# Patient Record
Sex: MALE | Race: BLACK OR AFRICAN AMERICAN | NOT HISPANIC OR LATINO | Employment: FULL TIME | URBAN - METROPOLITAN AREA
[De-identification: names, ages, dates, MRNs, and addresses within clinical notes are randomized per-mention and may not be internally consistent; named-entity substitution may affect disease eponyms.]

---

## 2023-05-02 ENCOUNTER — PATIENT OUTREACH (OUTPATIENT)
Dept: CARE COORDINATION | Facility: CLINIC | Age: 53
End: 2023-05-02
Payer: COMMERCIAL

## 2023-08-10 LAB
ALANINE AMINOTRANSFERASE (SGPT) (U/L) IN SER/PLAS: 13 U/L (ref 10–52)
ALBUMIN (G/DL) IN SER/PLAS: 4.3 G/DL (ref 3.4–5)
ALKALINE PHOSPHATASE (U/L) IN SER/PLAS: 51 U/L (ref 33–120)
ANION GAP IN SER/PLAS: 11 MMOL/L (ref 10–20)
ASPARTATE AMINOTRANSFERASE (SGOT) (U/L) IN SER/PLAS: 15 U/L (ref 9–39)
BILIRUBIN TOTAL (MG/DL) IN SER/PLAS: 0.3 MG/DL (ref 0–1.2)
CALCIUM (MG/DL) IN SER/PLAS: 9 MG/DL (ref 8.6–10.3)
CARBON DIOXIDE, TOTAL (MMOL/L) IN SER/PLAS: 27 MMOL/L (ref 21–32)
CHLORIDE (MMOL/L) IN SER/PLAS: 106 MMOL/L (ref 98–107)
CHOLESTEROL (MG/DL) IN SER/PLAS: 129 MG/DL (ref 0–199)
CHOLESTEROL IN HDL (MG/DL) IN SER/PLAS: 50 MG/DL
CHOLESTEROL/HDL RATIO: 2.6
CREATININE (MG/DL) IN SER/PLAS: 1.19 MG/DL (ref 0.5–1.3)
CREATININE (MG/DL) IN URINE: 144 MG/DL (ref 20–370)
ERYTHROCYTE DISTRIBUTION WIDTH (RATIO) BY AUTOMATED COUNT: 14.2 % (ref 11.5–14.5)
ERYTHROCYTE MEAN CORPUSCULAR HEMOGLOBIN CONCENTRATION (G/DL) BY AUTOMATED: 31.4 G/DL (ref 32–36)
ERYTHROCYTE MEAN CORPUSCULAR VOLUME (FL) BY AUTOMATED COUNT: 88 FL (ref 80–100)
ERYTHROCYTES (10*6/UL) IN BLOOD BY AUTOMATED COUNT: 5.02 X10E12/L (ref 4.5–5.9)
GFR MALE: 74 ML/MIN/1.73M2
GLUCOSE (MG/DL) IN SER/PLAS: 114 MG/DL (ref 74–99)
HEMATOCRIT (%) IN BLOOD BY AUTOMATED COUNT: 44.2 % (ref 41–52)
HEMOGLOBIN (G/DL) IN BLOOD: 13.9 G/DL (ref 13.5–17.5)
LDL: 66 MG/DL (ref 0–99)
LEUKOCYTES (10*3/UL) IN BLOOD BY AUTOMATED COUNT: 7.9 X10E9/L (ref 4.4–11.3)
NRBC (PER 100 WBCS) BY AUTOMATED COUNT: 0 /100 WBC (ref 0–0)
PLATELETS (10*3/UL) IN BLOOD AUTOMATED COUNT: 309 X10E9/L (ref 150–450)
POTASSIUM (MMOL/L) IN SER/PLAS: 3.6 MMOL/L (ref 3.5–5.3)
PROTEIN (MG/DL) IN URINE: 12 MG/DL (ref 5–25)
PROTEIN TOTAL: 6.8 G/DL (ref 6.4–8.2)
PROTEIN/CREATININE (MG/MG) IN URINE: 0.08 MG/MG CREAT (ref 0–0.17)
SODIUM (MMOL/L) IN SER/PLAS: 140 MMOL/L (ref 136–145)
THYROTROPIN (MIU/L) IN SER/PLAS BY DETECTION LIMIT <= 0.05 MIU/L: 0.28 MIU/L (ref 0.44–3.98)
THYROXINE (T4) FREE (NG/DL) IN SER/PLAS: 1.06 NG/DL (ref 0.61–1.12)
TRIGLYCERIDE (MG/DL) IN SER/PLAS: 66 MG/DL (ref 0–149)
UREA NITROGEN (MG/DL) IN SER/PLAS: 17 MG/DL (ref 6–23)
VLDL: 13 MG/DL (ref 0–40)

## 2023-11-03 PROBLEM — M54.16 LUMBAR RADICULOPATHY: Status: ACTIVE | Noted: 2023-11-03

## 2023-11-03 PROBLEM — I10 HYPERTENSION: Status: ACTIVE | Noted: 2023-11-03

## 2023-11-03 PROBLEM — R53.83 FATIGUE: Status: ACTIVE | Noted: 2023-11-03

## 2023-11-03 PROBLEM — R79.89 LOW TSH LEVEL: Status: ACTIVE | Noted: 2023-11-03

## 2023-11-03 PROBLEM — N45.1 EPIDIDYMITIS, RIGHT: Status: ACTIVE | Noted: 2023-11-03

## 2023-11-03 PROBLEM — R60.0 BILATERAL EDEMA OF LOWER EXTREMITY: Status: ACTIVE | Noted: 2023-11-03

## 2023-11-03 PROBLEM — G89.29 CHRONIC LOW BACK PAIN: Status: ACTIVE | Noted: 2023-11-03

## 2023-11-03 PROBLEM — N50.819 PAIN IN TESTICLE DUE TO TRAUMA: Status: ACTIVE | Noted: 2023-11-03

## 2023-11-03 PROBLEM — N39.3 URINARY STRESS INCONTINENCE, MALE: Status: ACTIVE | Noted: 2023-11-03

## 2023-11-03 PROBLEM — M67.40 GANGLION CYST: Status: ACTIVE | Noted: 2023-11-03

## 2023-11-03 PROBLEM — N52.31 ERECTILE DYSFUNCTION FOLLOWING RADICAL PROSTATECTOMY: Status: ACTIVE | Noted: 2023-11-03

## 2023-11-03 PROBLEM — J45.909 ASTHMA (HHS-HCC): Status: ACTIVE | Noted: 2023-11-03

## 2023-11-03 PROBLEM — E87.0 HYPERNATREMIA: Status: ACTIVE | Noted: 2023-11-03

## 2023-11-03 PROBLEM — M54.50 CHRONIC LOW BACK PAIN: Status: ACTIVE | Noted: 2023-11-03

## 2023-11-03 PROBLEM — I48.91 AFIB (MULTI): Status: ACTIVE | Noted: 2023-11-03

## 2023-11-03 PROBLEM — C61 PROSTATE CANCER (MULTI): Status: ACTIVE | Noted: 2023-11-03

## 2023-11-03 PROBLEM — Z86.59 HISTORY OF CLAUSTROPHOBIA: Status: ACTIVE | Noted: 2023-11-03

## 2023-11-03 PROBLEM — I48.0 PAROXYSMAL ATRIAL FIBRILLATION WITH RAPID VENTRICULAR RESPONSE (MULTI): Status: ACTIVE | Noted: 2023-11-03

## 2023-11-03 PROBLEM — J30.2 SEASONAL ALLERGIES: Status: ACTIVE | Noted: 2023-11-03

## 2023-11-03 PROBLEM — N32.0 BLADDER NECK CONTRACTURE: Status: ACTIVE | Noted: 2023-11-03

## 2023-11-03 RX ORDER — AMLODIPINE BESYLATE 10 MG/1
10 TABLET ORAL DAILY
COMMUNITY

## 2023-11-03 RX ORDER — FLUTICASONE FUROATE, UMECLIDINIUM BROMIDE AND VILANTEROL TRIFENATATE 200; 62.5; 25 UG/1; UG/1; UG/1
POWDER RESPIRATORY (INHALATION)
COMMUNITY

## 2023-11-03 RX ORDER — DOXAZOSIN 1 MG/1
1 TABLET ORAL DAILY
COMMUNITY

## 2023-11-03 RX ORDER — HYDRALAZINE HYDROCHLORIDE 50 MG/1
50 TABLET, FILM COATED ORAL 3 TIMES DAILY
COMMUNITY

## 2023-11-03 RX ORDER — ALBUTEROL SULFATE 0.83 MG/ML
SOLUTION RESPIRATORY (INHALATION)
COMMUNITY
Start: 2023-04-01

## 2023-11-03 RX ORDER — METOPROLOL TARTRATE 25 MG/1
25 TABLET, FILM COATED ORAL 2 TIMES DAILY
COMMUNITY
Start: 2023-05-01 | End: 2024-01-29 | Stop reason: SDUPTHER

## 2023-11-03 RX ORDER — OLMESARTAN MEDOXOMIL 40 MG/1
40 TABLET ORAL DAILY
COMMUNITY

## 2023-11-03 RX ORDER — METOPROLOL TARTRATE 50 MG/1
50 TABLET ORAL 2 TIMES DAILY
COMMUNITY
End: 2024-03-27 | Stop reason: SDUPTHER

## 2023-11-03 RX ORDER — FLECAINIDE ACETATE 100 MG/1
100 TABLET ORAL 2 TIMES DAILY
COMMUNITY
End: 2024-01-12 | Stop reason: SDUPTHER

## 2023-11-03 RX ORDER — FUROSEMIDE 20 MG/1
20 TABLET ORAL DAILY
COMMUNITY
End: 2024-02-01 | Stop reason: SDUPTHER

## 2023-11-06 ENCOUNTER — OFFICE VISIT (OUTPATIENT)
Dept: CARDIOLOGY | Facility: CLINIC | Age: 53
End: 2023-11-06
Payer: COMMERCIAL

## 2023-11-06 VITALS
WEIGHT: 315 LBS | HEART RATE: 68 BPM | BODY MASS INDEX: 40.43 KG/M2 | HEIGHT: 74 IN | OXYGEN SATURATION: 94 % | SYSTOLIC BLOOD PRESSURE: 144 MMHG | DIASTOLIC BLOOD PRESSURE: 90 MMHG

## 2023-11-06 DIAGNOSIS — I48.0 PAROXYSMAL ATRIAL FIBRILLATION WITH RAPID VENTRICULAR RESPONSE (MULTI): Primary | ICD-10-CM

## 2023-11-06 PROCEDURE — 3080F DIAST BP >= 90 MM HG: CPT | Performed by: INTERNAL MEDICINE

## 2023-11-06 PROCEDURE — 3077F SYST BP >= 140 MM HG: CPT | Performed by: INTERNAL MEDICINE

## 2023-11-06 PROCEDURE — 99214 OFFICE O/P EST MOD 30 MIN: CPT | Performed by: INTERNAL MEDICINE

## 2023-11-06 NOTE — ASSESSMENT & PLAN NOTE
1.  Paroxysmal atrial fibrillation: Lennox is in sinus rhythm today and doing well on flecainide.  He has a remote history of pulmonary vein isolation and has had some symptomatic recurrences but overall is maintaining sinus rhythm.  We can consider a repeat PVI procedure in the future if necessary.  For now the AF burden is low and we will continue with pharmacologic management.    2.  Hypertension: Continue same medication regimen.

## 2023-11-06 NOTE — PROGRESS NOTES
"History Of Present Illness:      This is a 52-year-old male with hypertension and paroxysmal atrial fibrillation.  He was hospitalized at [facility] earlier this year because of pneumonia.  Since then he has been feeling well with no complaints of palpitations, chest pain, or shortness of breath.  He has had no side effects on flecainide and has not had any symptomatic recurrences of atrial fibrillation for the past several months.    Review of Systems  Other review of systems negative     Last Recorded Vitals:      12/7/2022     9:19 AM 12/9/2022    10:33 AM 1/25/2023     1:58 PM 5/22/2023     2:49 PM 5/22/2023     3:06 PM 6/9/2023     9:42 AM 11/6/2023     9:28 AM   Vitals   Systolic 142 132 140 150 138 144 144   Diastolic 89 88 88 90 80 78 90   Heart Rate 86 77 71 72  69 68   Temp 36.4 °C (97.6 °F) 36.7 °C (98.1 °F)    36.6 °C (97.8 °F)    Height (in) 1.854 m (6' 1\") 1.854 m (6' 1\") 1.854 m (6' 1\") 1.854 m (6' 1\")  1.854 m (6' 1\") 1.88 m (6' 2\")   Weight (lb) 320 317.31 326 327  327.44 330   BMI 42.22 kg/m2 41.86 kg/m2 43.01 kg/m2 43.14 kg/m2  43.2 kg/m2 42.37 kg/m2   BSA (m2) 2.73 m2 2.72 m2 2.76 m2 2.76 m2  2.77 m2 2.8 m2   Visit Report       Report          Allergies:  Iodine and Shellfish derived    Outpatient Medications:  Current Outpatient Medications   Medication Instructions    albuterol 2.5 mg /3 mL (0.083 %) nebulizer solution inhale contents of 1 vial ( 3 milliliters ) in nebulizer by mouth...  (REFER TO PRESCRIPTION NOTES).    amLODIPine (NORVASC) 10 mg, oral, Daily    doxazosin (CARDURA) 1 mg, oral, Daily    flecainide (TAMBOCOR) 100 mg, oral, 2 times daily    furosemide (LASIX) 20 mg, oral, Daily    hydrALAZINE (APRESOLINE) 50 mg, oral, 3 times daily    metoprolol tartrate (LOPRESSOR) 25 mg, oral, 2 times daily    metoprolol tartrate (LOPRESSOR) 50 mg, oral, 2 times daily    olmesartan (BENICAR) 40 mg, oral, Daily    Trelegy Ellipta 200-62.5-25 mcg blister with device INHALE " ONE INHALATION BY MOUTH ONCE DAILY       Physical Exam:    General Appearance:  Alert, oriented, no distress  Skin:  Warm and dry  Head and Neck:  No elevation of JVP, no carotid bruits  Cardiac Exam:  Rhythm is regular, S1 and S2 are normal, no murmur S3 or S4  Lungs:  Clear to auscultation  Extremities:  no edema  Neurologic:  No focal deficits  Psychiatric:  Appropriate mood and behavior         Cardiology Tests:  I have personally review the diagnostic cardiac testing and my interpretation is as follows:    Echocardiogram May 2023: Normal left ventricular function      Assessment/Plan   Problem List Items Addressed This Visit             ICD-10-CM    Paroxysmal atrial fibrillation with rapid ventricular response (CMS/HCC) - Primary I48.0     1.  Paroxysmal atrial fibrillation: Lennox is in sinus rhythm today and doing well on flecainide.  He has a remote history of pulmonary vein isolation and has had some symptomatic recurrences but overall is maintaining sinus rhythm.  We can consider a repeat PVI procedure in the future if necessary.  For now the AF burden is low and we will continue with pharmacologic management.    2.  Hypertension: Continue same medication regimen.            James C Ramicone, DO

## 2023-11-09 ENCOUNTER — OFFICE VISIT (OUTPATIENT)
Dept: UROLOGY | Facility: HOSPITAL | Age: 53
End: 2023-11-09
Payer: COMMERCIAL

## 2023-11-09 ENCOUNTER — PREP FOR PROCEDURE (OUTPATIENT)
Dept: UROLOGY | Facility: HOSPITAL | Age: 53
End: 2023-11-09

## 2023-11-09 VITALS — HEART RATE: 63 BPM | DIASTOLIC BLOOD PRESSURE: 79 MMHG | SYSTOLIC BLOOD PRESSURE: 134 MMHG

## 2023-11-09 DIAGNOSIS — N52.31 ERECTILE DYSFUNCTION FOLLOWING RADICAL PROSTATECTOMY: Primary | ICD-10-CM

## 2023-11-09 DIAGNOSIS — N52.31 ERECTILE DYSFUNCTION AFTER RADICAL PROSTATECTOMY: Primary | ICD-10-CM

## 2023-11-09 PROCEDURE — 99214 OFFICE O/P EST MOD 30 MIN: CPT | Performed by: UROLOGY

## 2023-11-09 PROCEDURE — 3075F SYST BP GE 130 - 139MM HG: CPT | Performed by: UROLOGY

## 2023-11-09 PROCEDURE — 3078F DIAST BP <80 MM HG: CPT | Performed by: UROLOGY

## 2023-11-09 RX ORDER — FLUCONAZOLE 2 MG/ML
200 INJECTION, SOLUTION INTRAVENOUS ONCE
Status: CANCELLED | OUTPATIENT
Start: 2023-11-09 | End: 2023-11-09

## 2023-11-09 RX ORDER — ACETAMINOPHEN 325 MG/1
650 TABLET ORAL ONCE
Status: CANCELLED | OUTPATIENT
Start: 2023-11-09 | End: 2023-11-09

## 2023-11-09 RX ORDER — GABAPENTIN 300 MG/1
600 CAPSULE ORAL ONCE
Status: CANCELLED | OUTPATIENT
Start: 2023-11-09 | End: 2023-11-09

## 2023-11-09 RX ORDER — CHLORHEXIDINE GLUCONATE 40 MG/ML
SOLUTION TOPICAL DAILY PRN
Status: CANCELLED | OUTPATIENT
Start: 2023-11-09

## 2023-11-09 RX ORDER — SODIUM CHLORIDE, SODIUM LACTATE, POTASSIUM CHLORIDE, CALCIUM CHLORIDE 600; 310; 30; 20 MG/100ML; MG/100ML; MG/100ML; MG/100ML
100 INJECTION, SOLUTION INTRAVENOUS CONTINUOUS
Status: CANCELLED | OUTPATIENT
Start: 2023-11-09

## 2023-11-09 ASSESSMENT — PAIN SCALES - GENERAL: PAINLEVEL: 0-NO PAIN

## 2023-11-09 NOTE — PROGRESS NOTES
NAME:Lennox Domingo  DATE: 11/9/2023               Subjective:   Chief complaint: ED    HPI:  52 y.o. male with h/o Pca s/p RALP with Dr. Martinez.  PSA UDT    Pt with h/o bladder neck contracture s/p BNI Feb 2022.  Had AUS in sept 2022.  Leakage improved but still has some bothersome incontinence.      Pt with severe ED. Failed PDE5i, not able to do ICI     Past Medical History:   Diagnosis Date    Achilles tendinitis, unspecified leg 01/28/2018    Tendonitis, Achilles    Nocturia 03/04/2016    Nocturia    Olecranon bursitis, left elbow 04/01/2016    Olecranon bursitis of left elbow    Other specified abnormal findings of blood chemistry 10/24/2022    Low TSH level    Other specified soft tissue disorders 06/08/2016    Swelling of left lower extremity    Other specified soft tissue disorders 06/06/2016    Calf swelling    Personal history of diseases of the skin and subcutaneous tissue 01/08/2016    History of contact dermatitis    Personal history of other diseases of the circulatory system     History of atrial fibrillation    Personal history of other diseases of the circulatory system     History of hypertension    Personal history of other diseases of the circulatory system 03/21/2018    History of atrial fibrillation    Personal history of other infectious and parasitic diseases 12/03/2020    History of viral infection    Personal history of pneumonia (recurrent)     History of pneumonia    Sprain of unspecified ligament of unspecified ankle, initial encounter 11/23/2016    Ankle sprain    Unspecified asthma with (acute) exacerbation 03/05/2018    Asthma exacerbation     Past Surgical History:   Procedure Laterality Date    ABLATION OF DYSRHYTHMIC FOCUS  01/08/2016    Catheter Ablation     Social History     Tobacco Use    Smoking status: Not on file    Smokeless tobacco: Not on file   Substance Use Topics    Alcohol use: Not on file     No family history on file.    Current Outpatient Medications on File Prior  to Visit   Medication Sig Dispense Refill    albuterol 2.5 mg /3 mL (0.083 %) nebulizer solution inhale contents of 1 vial ( 3 milliliters ) in nebulizer by mouth...  (REFER TO PRESCRIPTION NOTES).      amLODIPine (Norvasc) 10 mg tablet Take 1 tablet (10 mg) by mouth once daily.      doxazosin (Cardura) 1 mg tablet Take 1 tablet (1 mg) by mouth once daily.      flecainide (Tambocor) 100 mg tablet Take 1 tablet (100 mg) by mouth 2 times a day.      furosemide (Lasix) 20 mg tablet Take 1 tablet (20 mg) by mouth once daily.      hydrALAZINE (Apresoline) 50 mg tablet Take 1 tablet (50 mg) by mouth 3 times a day.      metoprolol tartrate (Lopressor) 25 mg tablet Take 1 tablet (25 mg) by mouth 2 times a day.      metoprolol tartrate (Lopressor) 50 mg tablet Take 1 tablet by mouth 2 times a day.      olmesartan (BENIcar) 40 mg tablet Take 1 tablet (40 mg) by mouth once daily.      Trelegy Ellipta 200-62.5-25 mcg blister with device INHALE ONE INHALATION BY MOUTH ONCE DAILY       No current facility-administered medications on file prior to visit.     Lennox is allergic to iodine and shellfish derived.       Objective:     There is no height or weight on file to calculate BMI.   There were no vitals taken for this visit.     Physical Exam   1. Constitutional: NAD, Well-developed, Well-nourished  2. Respiratory: Unlabored breathing, no audible wheezes, no use of accessory muscles   3. Cardiovascular: No JVD, extremities perfused, no edema  4. Abdomen: Soft, non-tender, non-distended, no masses or hernia.  No CVA tenderness.    5. Skin: no visible lesions, plaque, rashes, jaundice  6. Neuro: Gait normal, no focal neurologic deficit  7. Psychiatric: Mood and affect normal and appropriate, alert and oriented    Labs  Lab Results   Component Value Date    PSA <0.1 05/11/2022     Lab Results   Component Value Date    GFRMALE 74 08/10/2023     Lab Results   Component Value Date    CREATININE 1.19 08/10/2023     Lab Results    Component Value Date    CHOL 129 08/10/2023     Lab Results   Component Value Date    HDL 50.0 08/10/2023     Lab Results   Component Value Date    CHHDL 2.6 08/10/2023     Lab Results   Component Value Date    LDLF 66 08/10/2023     Lab Results   Component Value Date    VLDL 13 08/10/2023     Lab Results   Component Value Date    TRIG 66 08/10/2023     Lab Results   Component Value Date    HGBA1C 5.4 10/21/2022     Lab Results   Component Value Date    HCT 44.2 08/10/2023       Imaging    Procedures:     PVR:    Assessment/Plan:   Lennox Domingo presents with       1. Erectile dysfunction following radical prostatectomy      Plan for AMS Cx IPP after new year    Penile Prosthesis  The patient has erectile dysfunction refractory to conservative management. Etiology of his erectile dysfunction is post prostatectomy.     We discussed that further treatment would require penile prosthesis surgery.  We discussed that this artificial device would be placed into the penis and disrupt the tissue that creates natural erections so he can never go back to another kind of treatment for erections.  His penis may look and feel different, even with a completely deflated prosthesis.  We discussed that this procedure will not make his penis longer, and in fact he may look smaller then his previous natural erections.  We may have the patient use the vacuum erectile device to increase blood flow to his penis, if he is amenable.      We discussed the differences between the malleable (semirigid) and inflatable (hydraulic) types of penile prosthesis.  We discussed that the inflatable prosthesis would also include a pump in the scrotum and a reservoir that would be placed in the pelvis or abdominal wall.  We discussed that sometimes placement of the reservoir requires a second incision.  Models were used to show the prosthesis and patient was able to work with them.    Risks of the surgery were discussed, which include but are not  limited to pain, bleeding, erosion of the prosthesis, mechanical failure requiring surgical replacement, urinary retention, infection of the prosthesis requiring surgical removal and/or replacement, and damage to surrounding structures including the penis, urethra, bladder, and pelvic structures.      After all of the patient's questions were satisfactorily answered, he expressed understanding of the risks of surgery and wishes to proceed.  No barriers to learning were identified.    Patient ill be scheduled in a timely fashion.  He will see pre-admission testing and obtain any clearance if necessary.

## 2023-11-17 ENCOUNTER — PROCEDURE VISIT (OUTPATIENT)
Dept: CARDIOLOGY | Facility: CLINIC | Age: 53
End: 2023-11-17
Payer: COMMERCIAL

## 2023-11-17 DIAGNOSIS — N52.31 ERECTILE DYSFUNCTION AFTER RADICAL PROSTATECTOMY: ICD-10-CM

## 2023-11-17 PROCEDURE — 93005 ELECTROCARDIOGRAM TRACING: CPT

## 2023-11-17 PROCEDURE — 93010 ELECTROCARDIOGRAM REPORT: CPT | Performed by: INTERNAL MEDICINE

## 2023-11-20 LAB
ATRIAL RATE: 79 BPM
P AXIS: 60 DEGREES
P OFFSET: 180 MS
P ONSET: 115 MS
PR INTERVAL: 190 MS
Q ONSET: 210 MS
QRS COUNT: 13 BEATS
QRS DURATION: 108 MS
QT INTERVAL: 382 MS
QTC CALCULATION(BAZETT): 438 MS
QTC FREDERICIA: 418 MS
R AXIS: 73 DEGREES
T AXIS: 38 DEGREES
T OFFSET: 401 MS
VENTRICULAR RATE: 79 BPM

## 2023-12-13 ENCOUNTER — APPOINTMENT (OUTPATIENT)
Dept: ENDOCRINOLOGY | Facility: CLINIC | Age: 53
End: 2023-12-13
Payer: COMMERCIAL

## 2024-01-12 ENCOUNTER — TELEPHONE (OUTPATIENT)
Dept: PRIMARY CARE | Facility: CLINIC | Age: 54
End: 2024-01-12

## 2024-01-12 DIAGNOSIS — J45.21 MILD INTERMITTENT ASTHMA WITH ACUTE EXACERBATION (HHS-HCC): Primary | ICD-10-CM

## 2024-01-12 DIAGNOSIS — I48.0 PAROXYSMAL ATRIAL FIBRILLATION WITH RAPID VENTRICULAR RESPONSE (MULTI): ICD-10-CM

## 2024-01-12 RX ORDER — FLECAINIDE ACETATE 100 MG/1
100 TABLET ORAL 2 TIMES DAILY
Qty: 180 TABLET | Refills: 3 | Status: SHIPPED | OUTPATIENT
Start: 2024-01-12 | End: 2024-01-18 | Stop reason: SDUPTHER

## 2024-01-12 RX ORDER — PREDNISONE 20 MG/1
TABLET ORAL
Qty: 24 TABLET | Refills: 0 | Status: SHIPPED | OUTPATIENT
Start: 2024-01-12 | End: 2024-01-29 | Stop reason: ALTCHOICE

## 2024-01-18 DIAGNOSIS — I48.0 PAROXYSMAL ATRIAL FIBRILLATION WITH RAPID VENTRICULAR RESPONSE (MULTI): ICD-10-CM

## 2024-01-18 NOTE — TELEPHONE ENCOUNTER
Patient calling for refill on Flecainide.    On 1/12 refill sent- but unsure if erx went to correct pharmacy/any pharmacy. Will resend and call in 30 day supply.

## 2024-01-22 RX ORDER — FLECAINIDE ACETATE 100 MG/1
100 TABLET ORAL 2 TIMES DAILY
Qty: 180 TABLET | Refills: 3 | Status: SHIPPED | OUTPATIENT
Start: 2024-01-22 | End: 2025-01-21

## 2024-01-24 PROBLEM — N52.31 ERECTILE DYSFUNCTION AFTER RADICAL PROSTATECTOMY: Status: ACTIVE | Noted: 2023-11-09

## 2024-01-25 ENCOUNTER — TELEPHONE (OUTPATIENT)
Dept: PREADMISSION TESTING | Facility: HOSPITAL | Age: 54
End: 2024-01-25
Payer: COMMERCIAL

## 2024-01-29 ENCOUNTER — TELEMEDICINE CLINICAL SUPPORT (OUTPATIENT)
Dept: PREADMISSION TESTING | Facility: HOSPITAL | Age: 54
End: 2024-01-29
Payer: COMMERCIAL

## 2024-01-31 ENCOUNTER — LAB (OUTPATIENT)
Dept: LAB | Facility: LAB | Age: 54
End: 2024-01-31
Payer: COMMERCIAL

## 2024-01-31 ENCOUNTER — PRE-ADMISSION TESTING (OUTPATIENT)
Dept: PREADMISSION TESTING | Facility: HOSPITAL | Age: 54
End: 2024-01-31
Payer: COMMERCIAL

## 2024-01-31 VITALS
DIASTOLIC BLOOD PRESSURE: 87 MMHG | RESPIRATION RATE: 16 BRPM | OXYGEN SATURATION: 96 % | WEIGHT: 315 LBS | HEIGHT: 73 IN | BODY MASS INDEX: 41.75 KG/M2 | SYSTOLIC BLOOD PRESSURE: 152 MMHG | TEMPERATURE: 98.2 F | HEART RATE: 75 BPM

## 2024-01-31 DIAGNOSIS — E66.01 CLASS 3 SEVERE OBESITY DUE TO EXCESS CALORIES WITH SERIOUS COMORBIDITY AND BODY MASS INDEX (BMI) OF 40.0 TO 44.9 IN ADULT (MULTI): ICD-10-CM

## 2024-01-31 DIAGNOSIS — Z01.818 PREOP EXAMINATION: ICD-10-CM

## 2024-01-31 DIAGNOSIS — N52.31 ERECTILE DYSFUNCTION AFTER RADICAL PROSTATECTOMY: ICD-10-CM

## 2024-01-31 DIAGNOSIS — Z01.818 PREOP EXAMINATION: Primary | ICD-10-CM

## 2024-01-31 LAB
ALBUMIN SERPL BCP-MCNC: 4 G/DL (ref 3.4–5)
ALP SERPL-CCNC: 48 U/L (ref 33–120)
ALT SERPL W P-5'-P-CCNC: 14 U/L (ref 10–52)
ANION GAP SERPL CALC-SCNC: 10 MMOL/L (ref 10–20)
APTT PPP: 34 SECONDS (ref 27–38)
AST SERPL W P-5'-P-CCNC: 13 U/L (ref 9–39)
ATRIAL RATE: 71 BPM
BASOPHILS # BLD AUTO: 0.1 X10*3/UL (ref 0–0.1)
BASOPHILS NFR BLD AUTO: 1.2 %
BILIRUB SERPL-MCNC: 0.4 MG/DL (ref 0–1.2)
BUN SERPL-MCNC: 17 MG/DL (ref 6–23)
CALCIUM SERPL-MCNC: 9.2 MG/DL (ref 8.6–10.3)
CHLORIDE SERPL-SCNC: 104 MMOL/L (ref 98–107)
CO2 SERPL-SCNC: 32 MMOL/L (ref 21–32)
CREAT SERPL-MCNC: 1.22 MG/DL (ref 0.5–1.3)
EGFRCR SERPLBLD CKD-EPI 2021: 71 ML/MIN/1.73M*2
EOSINOPHIL # BLD AUTO: 0.17 X10*3/UL (ref 0–0.7)
EOSINOPHIL NFR BLD AUTO: 2 %
ERYTHROCYTE [DISTWIDTH] IN BLOOD BY AUTOMATED COUNT: 15.3 % (ref 11.5–14.5)
GLUCOSE SERPL-MCNC: 98 MG/DL (ref 74–99)
HCT VFR BLD AUTO: 42.3 % (ref 41–52)
HGB BLD-MCNC: 13.4 G/DL (ref 13.5–17.5)
IMM GRANULOCYTES # BLD AUTO: 0.02 X10*3/UL (ref 0–0.7)
IMM GRANULOCYTES NFR BLD AUTO: 0.2 % (ref 0–0.9)
INR PPP: 0.9 (ref 0.9–1.1)
LYMPHOCYTES # BLD AUTO: 3.27 X10*3/UL (ref 1.2–4.8)
LYMPHOCYTES NFR BLD AUTO: 38.6 %
MCH RBC QN AUTO: 28.2 PG (ref 26–34)
MCHC RBC AUTO-ENTMCNC: 31.7 G/DL (ref 32–36)
MCV RBC AUTO: 89 FL (ref 80–100)
MONOCYTES # BLD AUTO: 0.74 X10*3/UL (ref 0.1–1)
MONOCYTES NFR BLD AUTO: 8.7 %
NEUTROPHILS # BLD AUTO: 4.18 X10*3/UL (ref 1.2–7.7)
NEUTROPHILS NFR BLD AUTO: 49.3 %
NRBC BLD-RTO: 0 /100 WBCS (ref 0–0)
P AXIS: 18 DEGREES
P OFFSET: 191 MS
P ONSET: 131 MS
PLATELET # BLD AUTO: 288 X10*3/UL (ref 150–450)
POTASSIUM SERPL-SCNC: 4 MMOL/L (ref 3.5–5.3)
PR INTERVAL: 188 MS
PROT SERPL-MCNC: 6.5 G/DL (ref 6.4–8.2)
PROTHROMBIN TIME: 10.6 SECONDS (ref 9.8–12.8)
Q ONSET: 225 MS
QRS COUNT: 12 BEATS
QRS DURATION: 108 MS
QT INTERVAL: 416 MS
QTC CALCULATION(BAZETT): 452 MS
QTC FREDERICIA: 440 MS
R AXIS: 83 DEGREES
RBC # BLD AUTO: 4.76 X10*6/UL (ref 4.5–5.9)
SODIUM SERPL-SCNC: 142 MMOL/L (ref 136–145)
T AXIS: 26 DEGREES
T OFFSET: 433 MS
VENTRICULAR RATE: 71 BPM
WBC # BLD AUTO: 8.5 X10*3/UL (ref 4.4–11.3)

## 2024-01-31 PROCEDURE — 85025 COMPLETE CBC W/AUTO DIFF WBC: CPT

## 2024-01-31 PROCEDURE — 87081 CULTURE SCREEN ONLY: CPT | Mod: AHULAB | Performed by: NURSE PRACTITIONER

## 2024-01-31 PROCEDURE — 85730 THROMBOPLASTIN TIME PARTIAL: CPT

## 2024-01-31 PROCEDURE — 85610 PROTHROMBIN TIME: CPT

## 2024-01-31 PROCEDURE — 99203 OFFICE O/P NEW LOW 30 MIN: CPT | Performed by: NURSE PRACTITIONER

## 2024-01-31 PROCEDURE — 83036 HEMOGLOBIN GLYCOSYLATED A1C: CPT

## 2024-01-31 PROCEDURE — 36415 COLL VENOUS BLD VENIPUNCTURE: CPT

## 2024-01-31 PROCEDURE — 80053 COMPREHEN METABOLIC PANEL: CPT

## 2024-01-31 PROCEDURE — 93005 ELECTROCARDIOGRAM TRACING: CPT | Performed by: NURSE PRACTITIONER

## 2024-01-31 RX ORDER — CHLORHEXIDINE GLUCONATE ORAL RINSE 1.2 MG/ML
15 SOLUTION DENTAL 2 TIMES DAILY
Qty: 473 ML | Refills: 0 | Status: SHIPPED | OUTPATIENT
Start: 2024-01-31

## 2024-01-31 ASSESSMENT — ENCOUNTER SYMPTOMS
RHINORRHEA: 1
RESPIRATORY NEGATIVE: 1
GASTROINTESTINAL NEGATIVE: 1
MUSCULOSKELETAL NEGATIVE: 1
ENDOCRINE NEGATIVE: 1
NEUROLOGICAL NEGATIVE: 1
NECK NEGATIVE: 1
CONSTITUTIONAL NEGATIVE: 1
EYES NEGATIVE: 1

## 2024-01-31 NOTE — CPM/PAT H&P
[facility]/PAT Evaluation       Name: Lennox Domingo (Lennox Domingo)  /Age: 1970/53 y.o.     In-Person           HPI        Date of Consult: 24    Referring Provider: Dr. Mancilla    Insertion prothesis penis, 24    Lennox Domingo is a 53 year-old male who presents to the [facility] for perioperative risk assessment prior to surgery.    Patient presents with a primary diagnosis of ED.  h/o bladder neck contracture s/p BNI 2022.  Had AUS in 2022.  Leakage improved but still has some bothersome incontinence.  Pt with severe ED. Failed PDE5i, not able to do ICI    This note was created in part upon personal review of patient's medical records.      Patient is scheduled to have Insertion prothesis penis      Pt denies any past history of anesthetic complications such as PONV, awareness, prolonged sedation, dental damage, aspiration, cardiac arrest, difficult intubation, difficult I.V. access or unexpected hospital admissions.  NO malignant hyperthermia and or pseudocholinesterase deficiency.  No history of blood transfusions     REJI noncompliant with CPAP    The patient is not a [employer] and will accept blood and blood products if medically indicated.   Type and screen not sent.     Past Medical History:   Diagnosis Date    Asthma     Cardiology follow-up encounter 2023    James C Ramicone, DO    Erectile dysfunction after radical prostatectomy     Hypertension     Lumbar radiculopathy     Paroxysmal atrial fibrillation (CMS/HCC)     Prostate cancer (CMS/HCC)     Sleep apnea     does not use his CPAP       Past Surgical History:   Procedure Laterality Date    ABLATION OF DYSRHYTHMIC FOCUS      Catheter Ablation    PROSTATECTOMY       Social History     Socioeconomic History    Marital status:      Spouse name: Not on file    Number of children: Not on file    Years of education: Not on file    Highest education level: Not on file   Occupational History    Not on file    Tobacco Use    Smoking status: Some Days     Types: Cigars    Smokeless tobacco: Never   Vaping Use    Vaping Use: Never used   Substance and Sexual Activity    Alcohol use: Not Currently     Alcohol/week: 3.0 standard drinks of alcohol     Types: 3 Cans of beer per week    Drug use: Never    Sexual activity: Defer   Other Topics Concern    Not on file   Social History Narrative    Not on file     Social Determinants of Health     Financial Resource Strain: Not on file   Food Insecurity: Not on file   Transportation Needs: Not on file   Physical Activity: Not on file   Stress: Not on file   Social Connections: Not on file   Intimate Partner Violence: Not on file   Housing Stability: Not on file        Family History   Problem Relation Name Age of Onset    Thyroid disease Mother      Hypertension Mother      Atrial fibrillation Father      No Known Problems Sister      Asthma Brother         Allergies   Allergen Reactions    Shellfish Derived Anaphylaxis    Iodine Unknown       Current Outpatient Medications:     albuterol 2.5 mg /3 mL (0.083 %) nebulizer solution, inhale contents of 1 vial ( 3 milliliters ) in nebulizer by mouth...  (REFER TO PRESCRIPTION NOTES)., Disp: , Rfl:     amLODIPine (Norvasc) 10 mg tablet, Take 1 tablet (10 mg) by mouth once daily., Disp: , Rfl:     doxazosin (Cardura) 1 mg tablet, Take 1 tablet (1 mg) by mouth once daily., Disp: , Rfl:     flecainide (Tambocor) 100 mg tablet, Take 1 tablet (100 mg) by mouth 2 times a day., Disp: 180 tablet, Rfl: 3    furosemide (Lasix) 20 mg tablet, Take 1 tablet (20 mg) by mouth once daily., Disp: , Rfl:     hydrALAZINE (Apresoline) 50 mg tablet, Take 1 tablet (50 mg) by mouth 3 times a day., Disp: , Rfl:     metoprolol tartrate (Lopressor) 50 mg tablet, Take 1 tablet by mouth 2 times a day., Disp: , Rfl:     olmesartan (BENIcar) 40 mg tablet, Take 1 tablet (40 mg) by mouth once daily., Disp: , Rfl:     Trelegy Ellipta 200-62.5-25 mcg blister with  "device, INHALE ONE INHALATION BY MOUTH ONCE DAILY, Disp: , Rfl:     chlorhexidine (Peridex) 0.12 % solution, Use 15 mL in the mouth or throat 2 times a day. Use night before surgery and morning of surgery Swish and spit, Disp: 473 mL, Rfl: 0        PAT ROS:   Constitutional:   neg    Neuro/Psych:   neg    Eyes:   neg    Ears:   Nose:    nasal discharge (allergies)  Mouth:   neg    Throat:   neg    Neck:   neg    Cardio:    Functional 4 mets. Denies sob walking from [facility] to parking lot  Respiratory:   neg    Endocrine:   neg    GI:   neg    :   neg    Musculoskeletal:   neg    Hematologic:   neg    Skin:  neg        Physical Exam  Vitals reviewed. Physical exam within normal limits.          PAT AIRWAY:   Airway:     Mallampati::  III    Neck ROM::  Full   Missing one tooth      Heart Rate:  [75]   Temp:  [36.8 °C (98.2 °F)]   Resp:  [16]   BP: (152)/(87)   Height:  [185.4 cm (6' 1\")]   Weight:  [151 kg (332 lb 14.3 oz)]   SpO2:  [96 %]      Lab Results   Component Value Date    GLUCOSE 98 01/31/2024    CALCIUM 9.2 01/31/2024     01/31/2024    K 4.0 01/31/2024    CO2 32 01/31/2024     01/31/2024    BUN 17 01/31/2024    CREATININE 1.22 01/31/2024      Lab Results   Component Value Date    WBC 8.5 01/31/2024    HGB 13.4 (L) 01/31/2024    HCT 42.3 01/31/2024    MCV 89 01/31/2024     01/31/2024      ECHO 2019  PHYSICIAN INTERPRETATION:  Left Ventricle: The left ventricular systolic function is normal, with an estimated ejection fraction of 55-60%. There are no regional wall motion abnormalities. The left ventricular cavity size is upper limits of normal. Spectral Doppler shows a normal pattern of left ventricular diastolic filling.  Left Atrium: The left atrium is mild to moderately dilated. A bubble study using agitated saline was performed. Bubble study is negative. There is evidence of an atrial septal aneurysm.  Right Ventricle: The right ventricle is normal in size. There is normal right " "ventricular global systolic function.  Right Atrium: The right atrium is normal in size.  Aortic Valve: The aortic valve is probably trileaflet. There is no evidence of aortic valve regurgitation. The peak instantaneous gradient of the aortic valve is 5.8 mmHg.  Mitral Valve: The mitral valve is normal in structure. There is no evidence of mitral valve regurgitation.  Tricuspid Valve: The tricuspid valve is structurally normal. No evidence of tricuspid regurgitation. The right ventricular systolic pressure is unable to be estimated.  Pulmonic Valve: The pulmonic valve is structurally normal. There is no indication of pulmonic valve regurgitation.  Pericardium: There is no pericardial effusion noted.  Aorta: The aortic root is abnormal. There is no dilatation of the aortic arch. There is no dilatation of the ascending aorta. There is mild dilatation of the aortic root.        CONCLUSIONS:   1. The left ventricular systolic function is normal with a 55-60% estimated ejection fraction.   2. The left atrium is mild to moderately dilated.   3. Atrial septal aneurysm present.    EKG in PAT 1/31/24:  NSR  Normal EKG  HR 71 bpm    Assessment and Plan:         Patient is a 53-year-old male scheduled for a with Dr. Mancilla on 2/7/24 .  Patient has no active cardiac symptoms.   Patient denies any chest pain, tightness, heaviness, pressure, radiating pain, palpitations, irregular heartbeats, lightheadedness, cough, congestion, shortness of breath, JAIN, PND, near syncope, weight loss or gain.     RCRI  1 , 6 % Risk of MACE    Cardiology:  -- Patient has history of afib and follows up with Dr. Ramicone who states from last office visit 11/2023 \" Paroxysmal atrial fibrillation: Lennox is in sinus rhythm today and doing well on flecainide.  He has a remote history of pulmonary vein isolation and has had some symptomatic recurrences but overall is maintaining sinus rhythm.  We can consider a repeat PVI procedure in the future if " "necessary.  For now the AF burden is low and we will continue with pharmacologic management.\"  -- History of afib s/p ablation currently in NSR in PAT. No further action at this time    Pulmonology:  -- Known and treated  REJI- Patient was instructed to bring CPAP machine the morning of surgery. Recommend prioritizing  nonopioid analgesic techniques (regional and local anesthesia, nonsteroidal medications, etc) before the administration of opioids and close monitoring for hypoventilation after surgery due to REJI. Increased vigilance is recommended with the use of narcotics due to an increased risk for opioid induced respiratory depression  -- History of asthma: Recommend to take prescribe inhalers on DOS      Hematology:  Patient instructed to ambulate as soon as possible postoperatively to decrease thromboembolic risk.   Initiate mechanical DVT prophylaxis as soon as possible and initiate chemical prophylaxis when deemed safe from a bleeding standpoint post surgery.     Caprini: 7    Risk assessment complete.  Patient is scheduled for a low surgical risk procedure.        Preoperative medication instructions were provided and reviewed with the patient.  Any additional testing or evaluation was explained to the patient.  Nothing by mouth instructions were discussed and patient's questions were answered prior to conclusion to this encounter.  Patient verbalized understanding of preoperative instructions given in preadmission testing; discharge instructions available in EMR.    This note was dictated by a speech recognition.  Minor errors may have been detected in a speech recognition.          "

## 2024-01-31 NOTE — PREPROCEDURE INSTRUCTIONS
Medication List            Accurate as of January 31, 2024  1:01 PM. Always use your most recent med list.                albuterol 2.5 mg /3 mL (0.083 %) nebulizer solution  Medication Adjustments for Surgery: Take morning of surgery with sip of water, no other fluids     amLODIPine 10 mg tablet  Commonly known as: Norvasc  Medication Adjustments for Surgery: Take morning of surgery with sip of water, no other fluids     chlorhexidine 0.12 % solution  Commonly known as: Peridex  Use 15 mL in the mouth or throat 2 times a day. Use night before surgery and morning of surgery Swish and spit  Medication Adjustments for Surgery: Take morning of surgery with sip of water, no other fluids     doxazosin 1 mg tablet  Commonly known as: Cardura  Medication Adjustments for Surgery: Take morning of surgery with sip of water, no other fluids     flecainide 100 mg tablet  Commonly known as: Tambocor  Take 1 tablet (100 mg) by mouth 2 times a day.  Medication Adjustments for Surgery: Take morning of surgery with sip of water, no other fluids     furosemide 20 mg tablet  Commonly known as: Lasix  Medication Adjustments for Surgery: Continue until night before surgery     hydrALAZINE 50 mg tablet  Commonly known as: Apresoline  Medication Adjustments for Surgery: Take morning of surgery with sip of water, no other fluids     metoprolol tartrate 50 mg tablet  Commonly known as: Lopressor  Medication Adjustments for Surgery: Take morning of surgery with sip of water, no other fluids     olmesartan 40 mg tablet  Commonly known as: BENIcar  Medication Adjustments for Surgery: Continue until night before surgery     Trelegy Ellipta 200-62.5-25 mcg blister with device  Generic drug: fluticasone-umeclidin-vilanter  Medication Adjustments for Surgery: Take morning of surgery with sip of water, no other fluids                      CONTACT SURGEON'S OFFICE IF YOU DEVELOP:  * Fever = 100.4 F   * New respiratory symptoms (e.g. cough,  shortness of breath, respiratory distress, sore throat)  * Recent loss of taste or smell  *Flu like symptoms such as headache, fatigue or gastrointestinal symptoms  * You develop any open sores, shingles, burning or painful urination   AND/OR:  * You no longer wish to have the surgery.  * Any other personal circumstances change that may lead to the need to cancel or defer this surgery.  *You were admitted to any hospital within one week of your planned procedure.    SMOKING:  *Quitting smoking can make a huge difference to your health and recovery from surgery.    *If you need help with quitting, call 5-901-QUIT-NOW.    THE DAY BEFORE SURGERY:  *Do not eat any food after midnight the night before surgery.   *You are permitted to drink clear liquids (i.e. water, black coffee, tea, clear broth, apple juice) up to 2 hours before your surgery.    DIABETICS:  If diabetic, nothing by mouth (no solids or liquids) for 8 hours prior to surgery.   Please check fasting blood sugar upon waking up.  If fasting sugar is <80 mg/dl, please drink 100ml/3oz of apple juice no later than 2 hours prior to surgery.      SURGICAL TIME  *You will be contacted between 2 p.m. and 6 p.m. the business day before your surgery with your arrival time.  *If you haven't received a call by 6pm, call [phone].  *Scheduled surgery times may change and you will be notified if this occurs-check your personal voicemail for any updates.    ON THE MORNING OF SURGERY:  *Wear comfortable, loose fitting clothing.   *Do not use moisturizers, creams, lotions or perfume.  *All jewelry and valuables should be left at home.  *Prosthetic devices such as contact lenses, hearing aids, dentures, eyelash extensions, hairpins and body piercing must be removed before surgery.    BRING WITH YOU:  *Photo ID and insurance card  *Current list of medicines and allergies  *Pacemaker/Defibrillator/Heart stent cards  *CPAP machine and mask  *Slings/splints/crutches  *Copy of  your complete Advanced Directive/DHPOA-if applicable  *Neurostimulator implant remote    PARKING AND ARRIVAL:  *Check in at the Main Entrance desk and let them know you are here for surgery.  *You will be directed to the 2nd floor surgical waiting area.    AFTER OUTPATIENT SURGERY:  *A responsible adult MUST accompany you at the time of discharge and stay with you for 24 hours after your surgery.  *You may NOT drive yourself home after surgery.  *You may use a taxi or ride sharing service ([employer], [employer]) to return home ONLY if you are accompanied by a friend or family member.  *Instructions for resuming your medications will be provided by your surgeon.

## 2024-01-31 NOTE — H&P (VIEW-ONLY)
[facility]/PAT Evaluation       Name: Lennox Domingo (Lennox Domingo)  /Age: 1970/53 y.o.     In-Person           HPI        Date of Consult: 24    Referring Provider: Dr. Mancilla    Insertion prothesis penis, 24    Lennox Domingo is a 53 year-old male who presents to the [facility] for perioperative risk assessment prior to surgery.    Patient presents with a primary diagnosis of ED.  h/o bladder neck contracture s/p BNI 2022.  Had AUS in 2022.  Leakage improved but still has some bothersome incontinence.  Pt with severe ED. Failed PDE5i, not able to do ICI    This note was created in part upon personal review of patient's medical records.      Patient is scheduled to have Insertion prothesis penis      Pt denies any past history of anesthetic complications such as PONV, awareness, prolonged sedation, dental damage, aspiration, cardiac arrest, difficult intubation, difficult I.V. access or unexpected hospital admissions.  NO malignant hyperthermia and or pseudocholinesterase deficiency.  No history of blood transfusions     REJI noncompliant with CPAP    The patient is not a Jain and will accept blood and blood products if medically indicated.   Type and screen not sent.     Past Medical History:   Diagnosis Date    Asthma     Cardiology follow-up encounter 2023    James C Ramicone, DO    Erectile dysfunction after radical prostatectomy     Hypertension     Lumbar radiculopathy     Paroxysmal atrial fibrillation (CMS/HCC)     Prostate cancer (CMS/HCC)     Sleep apnea     does not use his CPAP       Past Surgical History:   Procedure Laterality Date    ABLATION OF DYSRHYTHMIC FOCUS      Catheter Ablation    PROSTATECTOMY       Social History     Socioeconomic History    Marital status:      Spouse name: Not on file    Number of children: Not on file    Years of education: Not on file    Highest education level: Not on file   Occupational History    Not on file    Tobacco Use    Smoking status: Some Days     Types: Cigars    Smokeless tobacco: Never   Vaping Use    Vaping Use: Never used   Substance and Sexual Activity    Alcohol use: Not Currently     Alcohol/week: 3.0 standard drinks of alcohol     Types: 3 Cans of beer per week    Drug use: Never    Sexual activity: Defer   Other Topics Concern    Not on file   Social History Narrative    Not on file     Social Determinants of Health     Financial Resource Strain: Not on file   Food Insecurity: Not on file   Transportation Needs: Not on file   Physical Activity: Not on file   Stress: Not on file   Social Connections: Not on file   Intimate Partner Violence: Not on file   Housing Stability: Not on file        Family History   Problem Relation Name Age of Onset    Thyroid disease Mother      Hypertension Mother      Atrial fibrillation Father      No Known Problems Sister      Asthma Brother         Allergies   Allergen Reactions    Shellfish Derived Anaphylaxis    Iodine Unknown       Current Outpatient Medications:     albuterol 2.5 mg /3 mL (0.083 %) nebulizer solution, inhale contents of 1 vial ( 3 milliliters ) in nebulizer by mouth...  (REFER TO PRESCRIPTION NOTES)., Disp: , Rfl:     amLODIPine (Norvasc) 10 mg tablet, Take 1 tablet (10 mg) by mouth once daily., Disp: , Rfl:     doxazosin (Cardura) 1 mg tablet, Take 1 tablet (1 mg) by mouth once daily., Disp: , Rfl:     flecainide (Tambocor) 100 mg tablet, Take 1 tablet (100 mg) by mouth 2 times a day., Disp: 180 tablet, Rfl: 3    furosemide (Lasix) 20 mg tablet, Take 1 tablet (20 mg) by mouth once daily., Disp: , Rfl:     hydrALAZINE (Apresoline) 50 mg tablet, Take 1 tablet (50 mg) by mouth 3 times a day., Disp: , Rfl:     metoprolol tartrate (Lopressor) 50 mg tablet, Take 1 tablet by mouth 2 times a day., Disp: , Rfl:     olmesartan (BENIcar) 40 mg tablet, Take 1 tablet (40 mg) by mouth once daily., Disp: , Rfl:     Trelegy Ellipta 200-62.5-25 mcg blister with  "device, INHALE ONE INHALATION BY MOUTH ONCE DAILY, Disp: , Rfl:     chlorhexidine (Peridex) 0.12 % solution, Use 15 mL in the mouth or throat 2 times a day. Use night before surgery and morning of surgery Swish and spit, Disp: 473 mL, Rfl: 0        PAT ROS:   Constitutional:   neg    Neuro/Psych:   neg    Eyes:   neg    Ears:   Nose:    nasal discharge (allergies)  Mouth:   neg    Throat:   neg    Neck:   neg    Cardio:    Functional 4 mets. Denies sob walking from [facility] to parking lot  Respiratory:   neg    Endocrine:   neg    GI:   neg    :   neg    Musculoskeletal:   neg    Hematologic:   neg    Skin:  neg        Physical Exam  Vitals reviewed. Physical exam within normal limits.          PAT AIRWAY:   Airway:     Mallampati::  III    Neck ROM::  Full   Missing one tooth      Heart Rate:  [75]   Temp:  [36.8 °C (98.2 °F)]   Resp:  [16]   BP: (152)/(87)   Height:  [185.4 cm (6' 1\")]   Weight:  [151 kg (332 lb 14.3 oz)]   SpO2:  [96 %]      Lab Results   Component Value Date    GLUCOSE 98 01/31/2024    CALCIUM 9.2 01/31/2024     01/31/2024    K 4.0 01/31/2024    CO2 32 01/31/2024     01/31/2024    BUN 17 01/31/2024    CREATININE 1.22 01/31/2024      Lab Results   Component Value Date    WBC 8.5 01/31/2024    HGB 13.4 (L) 01/31/2024    HCT 42.3 01/31/2024    MCV 89 01/31/2024     01/31/2024      ECHO 2019  PHYSICIAN INTERPRETATION:  Left Ventricle: The left ventricular systolic function is normal, with an estimated ejection fraction of 55-60%. There are no regional wall motion abnormalities. The left ventricular cavity size is upper limits of normal. Spectral Doppler shows a normal pattern of left ventricular diastolic filling.  Left Atrium: The left atrium is mild to moderately dilated. A bubble study using agitated saline was performed. Bubble study is negative. There is evidence of an atrial septal aneurysm.  Right Ventricle: The right ventricle is normal in size. There is normal right " "ventricular global systolic function.  Right Atrium: The right atrium is normal in size.  Aortic Valve: The aortic valve is probably trileaflet. There is no evidence of aortic valve regurgitation. The peak instantaneous gradient of the aortic valve is 5.8 mmHg.  Mitral Valve: The mitral valve is normal in structure. There is no evidence of mitral valve regurgitation.  Tricuspid Valve: The tricuspid valve is structurally normal. No evidence of tricuspid regurgitation. The right ventricular systolic pressure is unable to be estimated.  Pulmonic Valve: The pulmonic valve is structurally normal. There is no indication of pulmonic valve regurgitation.  Pericardium: There is no pericardial effusion noted.  Aorta: The aortic root is abnormal. There is no dilatation of the aortic arch. There is no dilatation of the ascending aorta. There is mild dilatation of the aortic root.        CONCLUSIONS:   1. The left ventricular systolic function is normal with a 55-60% estimated ejection fraction.   2. The left atrium is mild to moderately dilated.   3. Atrial septal aneurysm present.    EKG in PAT 1/31/24:  NSR  Normal EKG  HR 71 bpm    Assessment and Plan:         Patient is a 53-year-old male scheduled for a with Dr. Mancilla on 2/7/24 .  Patient has no active cardiac symptoms.   Patient denies any chest pain, tightness, heaviness, pressure, radiating pain, palpitations, irregular heartbeats, lightheadedness, cough, congestion, shortness of breath, AJIN, PND, near syncope, weight loss or gain.     RCRI  1 , 6 % Risk of MACE    Cardiology:  -- Patient has history of afib and follows up with Dr. Ramicone who states from last office visit 11/2023 \" Paroxysmal atrial fibrillation: Lennox is in sinus rhythm today and doing well on flecainide.  He has a remote history of pulmonary vein isolation and has had some symptomatic recurrences but overall is maintaining sinus rhythm.  We can consider a repeat PVI procedure in the future if " "necessary.  For now the AF burden is low and we will continue with pharmacologic management.\"  -- History of afib s/p ablation currently in NSR in PAT. No further action at this time    Pulmonology:  -- Known and treated  REJI- Patient was instructed to bring CPAP machine the morning of surgery. Recommend prioritizing  nonopioid analgesic techniques (regional and local anesthesia, nonsteroidal medications, etc) before the administration of opioids and close monitoring for hypoventilation after surgery due to REJI. Increased vigilance is recommended with the use of narcotics due to an increased risk for opioid induced respiratory depression  -- History of asthma: Recommend to take prescribe inhalers on DOS      Hematology:  Patient instructed to ambulate as soon as possible postoperatively to decrease thromboembolic risk.   Initiate mechanical DVT prophylaxis as soon as possible and initiate chemical prophylaxis when deemed safe from a bleeding standpoint post surgery.     Caprini: 7    Risk assessment complete.  Patient is scheduled for a low surgical risk procedure.        Preoperative medication instructions were provided and reviewed with the patient.  Any additional testing or evaluation was explained to the patient.  Nothing by mouth instructions were discussed and patient's questions were answered prior to conclusion to this encounter.  Patient verbalized understanding of preoperative instructions given in preadmission testing; discharge instructions available in EMR.    This note was dictated by a speech recognition.  Minor errors may have been detected in a speech recognition.          "

## 2024-02-01 ENCOUNTER — OFFICE VISIT (OUTPATIENT)
Dept: ENDOCRINOLOGY | Facility: CLINIC | Age: 54
End: 2024-02-01
Payer: COMMERCIAL

## 2024-02-01 VITALS
RESPIRATION RATE: 16 BRPM | DIASTOLIC BLOOD PRESSURE: 85 MMHG | HEIGHT: 73 IN | SYSTOLIC BLOOD PRESSURE: 142 MMHG | BODY MASS INDEX: 41.75 KG/M2 | TEMPERATURE: 97.4 F | HEART RATE: 71 BPM | WEIGHT: 315 LBS

## 2024-02-01 DIAGNOSIS — I10 HYPERTENSION, UNSPECIFIED TYPE: ICD-10-CM

## 2024-02-01 DIAGNOSIS — R60.0 BILATERAL EDEMA OF LOWER EXTREMITY: ICD-10-CM

## 2024-02-01 DIAGNOSIS — E66.01 CLASS 3 SEVERE OBESITY DUE TO EXCESS CALORIES WITH SERIOUS COMORBIDITY AND BODY MASS INDEX (BMI) OF 40.0 TO 44.9 IN ADULT (MULTI): Primary | ICD-10-CM

## 2024-02-01 LAB
EST. AVERAGE GLUCOSE BLD GHB EST-MCNC: 120 MG/DL
HBA1C MFR BLD: 5.8 %

## 2024-02-01 PROCEDURE — 3079F DIAST BP 80-89 MM HG: CPT | Performed by: STUDENT IN AN ORGANIZED HEALTH CARE EDUCATION/TRAINING PROGRAM

## 2024-02-01 PROCEDURE — 99205 OFFICE O/P NEW HI 60 MIN: CPT | Performed by: STUDENT IN AN ORGANIZED HEALTH CARE EDUCATION/TRAINING PROGRAM

## 2024-02-01 PROCEDURE — 3008F BODY MASS INDEX DOCD: CPT | Performed by: STUDENT IN AN ORGANIZED HEALTH CARE EDUCATION/TRAINING PROGRAM

## 2024-02-01 PROCEDURE — 3077F SYST BP >= 140 MM HG: CPT | Performed by: STUDENT IN AN ORGANIZED HEALTH CARE EDUCATION/TRAINING PROGRAM

## 2024-02-01 RX ORDER — FUROSEMIDE 20 MG/1
20 TABLET ORAL DAILY
Qty: 90 TABLET | Refills: 3 | Status: SHIPPED | OUTPATIENT
Start: 2024-02-01

## 2024-02-01 ASSESSMENT — PAIN SCALES - GENERAL: PAINLEVEL: 0-NO PAIN

## 2024-02-01 NOTE — PROGRESS NOTES
Endocrinology  2/1/2024    History of Present Illness   Lennox Domingo is a 53 y.o. year old male with medical history of HTN, AF, prostate cancer s/p RALP, here for weight management with goal of potential Inspire for REJI.     Current regimen: None    Weight history:   Weight 280-->330s past few years 2/2 prostate surgery, inability to work out afterwards 2-3 years ago and shortly after surgery developed lumbar radiculopathy. Feels comfortable at 280s and wants to get back down to that weight    Childhood: Unsure, always been big  Adolescence: 220, kept weight as he played football  Adult: 280  Highest weight: 330s, current  Lowest weight: 220  Prior weight loss attempts: Atkins diet 20 years ago for about 1 year duration, weight dropped rapidly, had skin sagging, felt dizzy, sluggish.    Sleep: Poor, sleep apnea intermittently  Stress: Up and down, does not feel like it correlates with weight gain. Stressed he cannot work out  Family history of obesity: Everyone in family is on bigger side  Mood/Eating disorder: None  Current medications that are weight gain promoting: None  History of bariatric surgery: None    Diet: Rarely eats fast food, avoids excessive salt, eats some fried food but mostly baked food. Lots of vegetables. Feels like his diet is well and stable from previous years when he weighed 280lbs except for late snacking. ~2am, wakes up from sleep apnea and can't sleep. Craves sweet food will eat a handful of jonathan crackers, cereal.    Exercise: Walking all day at work, is unable to work out too much or lift weights because of LBP. Used to lift weights regularly with friends but has not been able to work out in a few years.     Nephrolithiasis: None  Hyperthyroidism: None  MAOI: None  Glaucoma: None  HTN: Yes, controlled  Seizure: None  Opioids: None  MTC: None  Pancreatitis: None  CVD: None      Review of Systems   General: Denies fever or chills   Head: Denies headache or vision changes   Neck:  Denies tenderness or lumps   Cardiac: Denies chest pain or palpitations   Respiratory: Denies shortness of breath or cough   GI: Denies abdominal pain, nausea, or vomiting   Extremities: Denies swelling   Skin: Denies rash     Medications     Current Outpatient Medications   Medication Instructions    albuterol 2.5 mg /3 mL (0.083 %) nebulizer solution inhale contents of 1 vial ( 3 milliliters ) in nebulizer by mouth...  (REFER TO PRESCRIPTION NOTES).    amLODIPine (NORVASC) 10 mg, oral, Daily    chlorhexidine (Peridex) 0.12 % solution 15 mL, Mouth/Throat, 2 times daily, Use night before surgery and morning of surgery Swish and spit    doxazosin (CARDURA) 1 mg, oral, Daily    flecainide (TAMBOCOR) 100 mg, oral, 2 times daily    furosemide (LASIX) 20 mg, oral, Daily    hydrALAZINE (APRESOLINE) 50 mg, oral, 3 times daily    metoprolol tartrate (LOPRESSOR) 50 mg, oral, 2 times daily    olmesartan (BENICAR) 40 mg, oral, Daily    Trelegy Ellipta 200-62.5-25 mcg blister with device INHALE ONE INHALATION BY MOUTH ONCE DAILY        History     Past Medical History:   Diagnosis Date    Asthma     Cardiology follow-up encounter 11/06/2023    James C Ramicone, DO    Erectile dysfunction after radical prostatectomy     Hypertension     Lumbar radiculopathy     Paroxysmal atrial fibrillation (CMS/HCC)     Prostate cancer (CMS/HCC)     Sleep apnea     does not use his CPAP       Past Surgical History:   Procedure Laterality Date    ABLATION OF DYSRHYTHMIC FOCUS  2014    Catheter Ablation    PROSTATECTOMY  2021       Family History   Problem Relation Name Age of Onset    Thyroid disease Mother      Hypertension Mother      Atrial fibrillation Father      No Known Problems Sister      Asthma Brother          Allergies   Allergen Reactions    Shellfish Derived Anaphylaxis    Iodine Unknown        Social history: Works in [place], walks frequently  Alcohol: More during summer, football season ~5-7 beers per week  Tobacco: Cigars  "every now and then  No rec drug use    Physical Exam   /85   Pulse 71   Temp 36.3 °C (97.4 °F)   Resp 16   Ht 1.854 m (6' 1\")   Wt (!) 152 kg (334 lb 8 oz)   BMI 44.13 kg/m²   General: Well appearing, no acute distress  Heart: Normal rate  Neck: Soft, nontender, no lymphadenopathy, thyroid normal in size   Lungs: Breathing comfortably on room air   Abdomen: Soft, nontender  Extremities: Warm, 2+ LE Edema  Skin: No rashes    Labs and Imaging     Lab Results   Component Value Date    HGBA1C 5.4 10/21/2022    HGBA1C 5.7 (A) 10/11/2021    HGBA1C 5.7 10/13/2020     01/31/2024    K 4.0 01/31/2024     01/31/2024    CO2 32 01/31/2024    BUN 17 01/31/2024    CREATININE 1.22 01/31/2024    CALCIUM 9.2 01/31/2024    ALBUMIN 4.0 01/31/2024    PROT 6.5 01/31/2024    BILITOT 0.4 01/31/2024    ALKPHOS 48 01/31/2024    ALT 14 01/31/2024    AST 13 01/31/2024    GLUCOSE 98 01/31/2024    CHOL 129 08/10/2023    TRIG 66 08/10/2023    HDL 50.0 08/10/2023         Assessment and Plan   Lennox Domingo is a 53 y.o. year old male with medical history of HTN, prostate ca s/p RALP, Afib s/p ablation, subclinical hyperthyroidism, and REJI cannot tolerate CPAP here for weight management prior to potential Inspire for his REJI.     Patient has been on the larger side his whole life, however recently had weight gain from 280-330s over the last few years after RALP. He used to work out and do resistance training frequently however after the surgery was not able to work out and developed lumbar radiculopathy, further hindering his exercise. Currently walks during work as his exercise, diet stable but could be a bit better regarding his snacking. His goal is to get back down to 250-280lbs.    Discussed multiple options moving forward including lifestyle changes of diet, either generalized decreased low caloric intake or specific diets such as intermittent fasting,, and physical activity as well as pharmacological options. Was not " interested in pharmacological options at this time. Through focus on diet and resistance training will aim for 10% weight loss in next 6m, roughly ~30lbs. To assist with this, will set him up with Nutrition. Hopefully with weight loss will also see improvement in his REJI and HTN.     Will add on A1c to yesterdays labs, would continue to monitor subclinical hyperthyroidism.    RTC: 4m     Aleksandr Encarnacion MD  Internal Medicine, PGY-1    I saw and evaluated the patient. I personally obtained the key and critical portions of the history and physical exam or was physically present for key and critical portions performed by the resident/fellow. I reviewed the resident/fellow's documentation and discussed the patient with the resident/fellow. I agree with the resident/fellow's medical decision making as documented in the note.     Discussed obesity as a chronic medical condition. Discussed weight loss in terms of decreased calorie intake - through dietary changes and possibly medications which act as appetite suppressants. Also discussed including resistance training to help increase muscle mass. Reasonable weight loss goal is 5-10% in 3-6 months. His goal weight is 250-285 lbs. Physical activity somewhat limited by LBP. At this point, he is not interested in medications. Will refer to nutritionist for further dietary counseling.     RTC 4 months     Rufina Reed,    Endocrinology

## 2024-02-01 NOTE — TELEPHONE ENCOUNTER
Pended furosemide 20 to Dr Ramicone. [facility] pharm [facility] [place]

## 2024-02-01 NOTE — PATIENT INSTRUCTIONS
- Please see nutrition   - Please start to incorporate resistance training   - Follow up 4 months

## 2024-02-02 LAB — STAPHYLOCOCCUS SPEC CULT: NORMAL

## 2024-02-07 ENCOUNTER — ANESTHESIA EVENT (OUTPATIENT)
Dept: OPERATING ROOM | Facility: HOSPITAL | Age: 54
End: 2024-02-07
Payer: COMMERCIAL

## 2024-02-07 ENCOUNTER — HOSPITAL ENCOUNTER (OUTPATIENT)
Facility: HOSPITAL | Age: 54
Setting detail: OUTPATIENT SURGERY
Discharge: HOME | End: 2024-02-07
Attending: UROLOGY | Admitting: UROLOGY
Payer: COMMERCIAL

## 2024-02-07 ENCOUNTER — ANESTHESIA (OUTPATIENT)
Dept: OPERATING ROOM | Facility: HOSPITAL | Age: 54
End: 2024-02-07
Payer: COMMERCIAL

## 2024-02-07 VITALS
TEMPERATURE: 97.7 F | RESPIRATION RATE: 15 BRPM | BODY MASS INDEX: 40.43 KG/M2 | HEART RATE: 70 BPM | OXYGEN SATURATION: 100 % | DIASTOLIC BLOOD PRESSURE: 86 MMHG | HEIGHT: 74 IN | SYSTOLIC BLOOD PRESSURE: 118 MMHG | WEIGHT: 315 LBS

## 2024-02-07 DIAGNOSIS — N52.31 ERECTILE DYSFUNCTION AFTER RADICAL PROSTATECTOMY: ICD-10-CM

## 2024-02-07 DIAGNOSIS — G89.18 ACUTE POSTOPERATIVE PAIN: Primary | ICD-10-CM

## 2024-02-07 LAB
ABO GROUP (TYPE) IN BLOOD: NORMAL
ANTIBODY SCREEN: NORMAL
RH FACTOR (ANTIGEN D): NORMAL

## 2024-02-07 PROCEDURE — 2780000003 HC OR 278 NO HCPCS: Performed by: UROLOGY

## 2024-02-07 PROCEDURE — 3600000008 HC OR TIME - EACH INCREMENTAL 1 MINUTE - PROCEDURE LEVEL THREE: Performed by: UROLOGY

## 2024-02-07 PROCEDURE — 3700000002 HC GENERAL ANESTHESIA TIME - EACH INCREMENTAL 1 MINUTE: Performed by: UROLOGY

## 2024-02-07 PROCEDURE — 2500000004 HC RX 250 GENERAL PHARMACY W/ HCPCS (ALT 636 FOR OP/ED): Performed by: ANESTHESIOLOGIST ASSISTANT

## 2024-02-07 PROCEDURE — C1813 PROSTHESIS, PENILE, INFLATAB: HCPCS | Performed by: UROLOGY

## 2024-02-07 PROCEDURE — 3700000001 HC GENERAL ANESTHESIA TIME - INITIAL BASE CHARGE: Performed by: UROLOGY

## 2024-02-07 PROCEDURE — A4217 STERILE WATER/SALINE, 500 ML: HCPCS | Performed by: UROLOGY

## 2024-02-07 PROCEDURE — 7100000009 HC PHASE TWO TIME - INITIAL BASE CHARGE: Performed by: UROLOGY

## 2024-02-07 PROCEDURE — 2720000007 HC OR 272 NO HCPCS: Performed by: UROLOGY

## 2024-02-07 PROCEDURE — 2500000005 HC RX 250 GENERAL PHARMACY W/O HCPCS: Performed by: ANESTHESIOLOGY

## 2024-02-07 PROCEDURE — 2500000004 HC RX 250 GENERAL PHARMACY W/ HCPCS (ALT 636 FOR OP/ED): Performed by: UROLOGY

## 2024-02-07 PROCEDURE — 36415 COLL VENOUS BLD VENIPUNCTURE: CPT | Performed by: UROLOGY

## 2024-02-07 PROCEDURE — 2500000005 HC RX 250 GENERAL PHARMACY W/O HCPCS: Performed by: UROLOGY

## 2024-02-07 PROCEDURE — A54405 PR INSERT,INFLATABLE PENILE PROSTHESIS: Performed by: ANESTHESIOLOGY

## 2024-02-07 PROCEDURE — 54405 INSERT MULTI-COMP PENIS PROS: CPT | Performed by: UROLOGY

## 2024-02-07 PROCEDURE — 2500000005 HC RX 250 GENERAL PHARMACY W/O HCPCS: Performed by: ANESTHESIOLOGIST ASSISTANT

## 2024-02-07 PROCEDURE — 7100000001 HC RECOVERY ROOM TIME - INITIAL BASE CHARGE: Performed by: UROLOGY

## 2024-02-07 PROCEDURE — A54405 PR INSERT,INFLATABLE PENILE PROSTHESIS: Performed by: ANESTHESIOLOGIST ASSISTANT

## 2024-02-07 PROCEDURE — 2500000001 HC RX 250 WO HCPCS SELF ADMINISTERED DRUGS (ALT 637 FOR MEDICARE OP): Performed by: UROLOGY

## 2024-02-07 PROCEDURE — 7100000010 HC PHASE TWO TIME - EACH INCREMENTAL 1 MINUTE: Performed by: UROLOGY

## 2024-02-07 PROCEDURE — 2500000004 HC RX 250 GENERAL PHARMACY W/ HCPCS (ALT 636 FOR OP/ED): Performed by: ANESTHESIOLOGY

## 2024-02-07 PROCEDURE — 7100000002 HC RECOVERY ROOM TIME - EACH INCREMENTAL 1 MINUTE: Performed by: UROLOGY

## 2024-02-07 PROCEDURE — 3600000003 HC OR TIME - INITIAL BASE CHARGE - PROCEDURE LEVEL THREE: Performed by: UROLOGY

## 2024-02-07 PROCEDURE — 2500000001 HC RX 250 WO HCPCS SELF ADMINISTERED DRUGS (ALT 637 FOR MEDICARE OP): Performed by: ANESTHESIOLOGY

## 2024-02-07 PROCEDURE — 86901 BLOOD TYPING SEROLOGIC RH(D): CPT | Performed by: UROLOGY

## 2024-02-07 DEVICE — IMPLANTABLE DEVICE: Type: IMPLANTABLE DEVICE | Site: GROIN | Status: FUNCTIONAL

## 2024-02-07 RX ORDER — CELECOXIB 400 MG/1
400 CAPSULE ORAL DAILY
Qty: 7 CAPSULE | Refills: 0 | Status: SHIPPED | OUTPATIENT
Start: 2024-02-07 | End: 2024-02-14

## 2024-02-07 RX ORDER — ACETAMINOPHEN 325 MG/1
650 TABLET ORAL EVERY 4 HOURS PRN
Status: DISCONTINUED | OUTPATIENT
Start: 2024-02-07 | End: 2024-02-07 | Stop reason: HOSPADM

## 2024-02-07 RX ORDER — ONDANSETRON HYDROCHLORIDE 2 MG/ML
INJECTION, SOLUTION INTRAVENOUS AS NEEDED
Status: DISCONTINUED | OUTPATIENT
Start: 2024-02-07 | End: 2024-02-07

## 2024-02-07 RX ORDER — SULFAMETHOXAZOLE AND TRIMETHOPRIM 800; 160 MG/1; MG/1
1 TABLET ORAL 2 TIMES DAILY
Qty: 10 TABLET | Refills: 0 | Status: SHIPPED | OUTPATIENT
Start: 2024-02-07 | End: 2024-02-12

## 2024-02-07 RX ORDER — ACETAMINOPHEN 325 MG/1
650 TABLET ORAL ONCE
Status: COMPLETED | OUTPATIENT
Start: 2024-02-07 | End: 2024-02-07

## 2024-02-07 RX ORDER — GABAPENTIN 300 MG/1
600 CAPSULE ORAL ONCE
Status: COMPLETED | OUTPATIENT
Start: 2024-02-07 | End: 2024-02-07

## 2024-02-07 RX ORDER — SUCCINYLCHOLINE CHLORIDE 20 MG/ML
INJECTION INTRAMUSCULAR; INTRAVENOUS AS NEEDED
Status: DISCONTINUED | OUTPATIENT
Start: 2024-02-07 | End: 2024-02-07

## 2024-02-07 RX ORDER — LIDOCAINE HYDROCHLORIDE 20 MG/ML
INJECTION, SOLUTION INFILTRATION; PERINEURAL AS NEEDED
Status: DISCONTINUED | OUTPATIENT
Start: 2024-02-07 | End: 2024-02-07

## 2024-02-07 RX ORDER — ROCURONIUM BROMIDE 10 MG/ML
INJECTION, SOLUTION INTRAVENOUS AS NEEDED
Status: DISCONTINUED | OUTPATIENT
Start: 2024-02-07 | End: 2024-02-07

## 2024-02-07 RX ORDER — SODIUM CHLORIDE, SODIUM LACTATE, POTASSIUM CHLORIDE, CALCIUM CHLORIDE 600; 310; 30; 20 MG/100ML; MG/100ML; MG/100ML; MG/100ML
100 INJECTION, SOLUTION INTRAVENOUS CONTINUOUS
Status: DISCONTINUED | OUTPATIENT
Start: 2024-02-07 | End: 2024-02-07 | Stop reason: HOSPADM

## 2024-02-07 RX ORDER — POLYETHYLENE GLYCOL 3350 17 G/17G
17 POWDER, FOR SOLUTION ORAL DAILY
Qty: 30 PACKET | Refills: 0 | Status: SHIPPED | OUTPATIENT
Start: 2024-02-07 | End: 2024-03-08

## 2024-02-07 RX ORDER — ONDANSETRON HYDROCHLORIDE 2 MG/ML
4 INJECTION, SOLUTION INTRAVENOUS ONCE AS NEEDED
Status: COMPLETED | OUTPATIENT
Start: 2024-02-07 | End: 2024-02-07

## 2024-02-07 RX ORDER — OXYCODONE HYDROCHLORIDE 5 MG/1
5 TABLET ORAL EVERY 4 HOURS PRN
Status: DISCONTINUED | OUTPATIENT
Start: 2024-02-07 | End: 2024-02-07 | Stop reason: HOSPADM

## 2024-02-07 RX ORDER — DEXAMETHASONE SODIUM PHOSPHATE 4 MG/ML
INJECTION, SOLUTION INTRA-ARTICULAR; INTRALESIONAL; INTRAMUSCULAR; INTRAVENOUS; SOFT TISSUE AS NEEDED
Status: DISCONTINUED | OUTPATIENT
Start: 2024-02-07 | End: 2024-02-07

## 2024-02-07 RX ORDER — ACETAMINOPHEN 325 MG/1
650 TABLET ORAL EVERY 6 HOURS PRN
Qty: 30 TABLET | Refills: 0 | Status: SHIPPED | OUTPATIENT
Start: 2024-02-07

## 2024-02-07 RX ORDER — ALBUTEROL SULFATE 0.83 MG/ML
2.5 SOLUTION RESPIRATORY (INHALATION) ONCE AS NEEDED
Status: DISCONTINUED | OUTPATIENT
Start: 2024-02-07 | End: 2024-02-07 | Stop reason: HOSPADM

## 2024-02-07 RX ORDER — FLUCONAZOLE 2 MG/ML
200 INJECTION, SOLUTION INTRAVENOUS ONCE
Status: COMPLETED | OUTPATIENT
Start: 2024-02-07 | End: 2024-02-07

## 2024-02-07 RX ORDER — MIDAZOLAM HYDROCHLORIDE 1 MG/ML
INJECTION INTRAMUSCULAR; INTRAVENOUS AS NEEDED
Status: DISCONTINUED | OUTPATIENT
Start: 2024-02-07 | End: 2024-02-07

## 2024-02-07 RX ORDER — PROPOFOL 10 MG/ML
INJECTION, EMULSION INTRAVENOUS AS NEEDED
Status: DISCONTINUED | OUTPATIENT
Start: 2024-02-07 | End: 2024-02-07

## 2024-02-07 RX ORDER — GABAPENTIN 300 MG/1
300 CAPSULE ORAL 3 TIMES DAILY
Qty: 21 CAPSULE | Refills: 0 | Status: SHIPPED | OUTPATIENT
Start: 2024-02-07 | End: 2024-02-14

## 2024-02-07 RX ORDER — OXYCODONE HYDROCHLORIDE 5 MG/1
5 TABLET ORAL EVERY 6 HOURS PRN
Qty: 15 TABLET | Refills: 0 | Status: SHIPPED | OUTPATIENT
Start: 2024-02-07

## 2024-02-07 RX ORDER — FENTANYL CITRATE 50 UG/ML
INJECTION, SOLUTION INTRAMUSCULAR; INTRAVENOUS AS NEEDED
Status: DISCONTINUED | OUTPATIENT
Start: 2024-02-07 | End: 2024-02-07

## 2024-02-07 RX ADMIN — PROPOFOL 100 MG: 10 INJECTION, EMULSION INTRAVENOUS at 12:04

## 2024-02-07 RX ADMIN — PROPOFOL 100 MG: 10 INJECTION, EMULSION INTRAVENOUS at 12:20

## 2024-02-07 RX ADMIN — LIDOCAINE HYDROCHLORIDE 100 MG: 20 INJECTION, SOLUTION INFILTRATION; PERINEURAL at 11:52

## 2024-02-07 RX ADMIN — ROCURONIUM BROMIDE 50 MG: 10 INJECTION, SOLUTION INTRAVENOUS at 12:27

## 2024-02-07 RX ADMIN — ONDANSETRON 4 MG: 2 INJECTION INTRAMUSCULAR; INTRAVENOUS at 12:56

## 2024-02-07 RX ADMIN — GLYCOPYRROLATE 0.2 MG: 0.2 INJECTION, SOLUTION INTRAMUSCULAR; INTRAVITREAL at 12:16

## 2024-02-07 RX ADMIN — HYDROMORPHONE HYDROCHLORIDE 0.5 MG: 1 INJECTION, SOLUTION INTRAMUSCULAR; INTRAVENOUS; SUBCUTANEOUS at 14:20

## 2024-02-07 RX ADMIN — HYDROMORPHONE HYDROCHLORIDE 0.5 MG: 1 INJECTION, SOLUTION INTRAMUSCULAR; INTRAVENOUS; SUBCUTANEOUS at 13:42

## 2024-02-07 RX ADMIN — ACETAMINOPHEN 650 MG: 325 TABLET ORAL at 10:02

## 2024-02-07 RX ADMIN — PIPERACILLIN SODIUM AND TAZOBACTAM SODIUM 3.38 G: 3; .375 INJECTION, SOLUTION INTRAVENOUS at 10:03

## 2024-02-07 RX ADMIN — DEXAMETHASONE SODIUM PHOSPHATE 8 MG: 4 INJECTION, SOLUTION INTRAMUSCULAR; INTRAVENOUS at 12:11

## 2024-02-07 RX ADMIN — PROPOFOL 200 MG: 10 INJECTION, EMULSION INTRAVENOUS at 11:52

## 2024-02-07 RX ADMIN — ONDANSETRON 4 MG: 2 INJECTION INTRAMUSCULAR; INTRAVENOUS at 14:35

## 2024-02-07 RX ADMIN — SUGAMMADEX 200 MG: 100 INJECTION, SOLUTION INTRAVENOUS at 13:09

## 2024-02-07 RX ADMIN — SUCCINYLCHOLINE CHLORIDE 160 MG: 20 INJECTION, SOLUTION INTRAMUSCULAR; INTRAVENOUS at 12:05

## 2024-02-07 RX ADMIN — FLUCONAZOLE 200 MG: 2 INJECTION, SOLUTION INTRAVENOUS at 11:42

## 2024-02-07 RX ADMIN — GABAPENTIN 600 MG: 300 CAPSULE ORAL at 10:02

## 2024-02-07 RX ADMIN — OXYCODONE HYDROCHLORIDE 5 MG: 5 TABLET ORAL at 14:53

## 2024-02-07 RX ADMIN — HYDROMORPHONE HYDROCHLORIDE 0.5 MG: 1 INJECTION, SOLUTION INTRAMUSCULAR; INTRAVENOUS; SUBCUTANEOUS at 13:26

## 2024-02-07 RX ADMIN — SODIUM CHLORIDE, POTASSIUM CHLORIDE, SODIUM LACTATE AND CALCIUM CHLORIDE: 600; 310; 30; 20 INJECTION, SOLUTION INTRAVENOUS at 11:42

## 2024-02-07 RX ADMIN — FENTANYL CITRATE 50 MCG: 50 INJECTION, SOLUTION INTRAMUSCULAR; INTRAVENOUS at 11:52

## 2024-02-07 RX ADMIN — HYDROMORPHONE HYDROCHLORIDE 0.5 MG: 1 INJECTION, SOLUTION INTRAMUSCULAR; INTRAVENOUS; SUBCUTANEOUS at 13:54

## 2024-02-07 RX ADMIN — VANCOMYCIN HYDROCHLORIDE 2000 MG: 10 INJECTION, POWDER, LYOPHILIZED, FOR SOLUTION INTRAVENOUS at 09:46

## 2024-02-07 RX ADMIN — MIDAZOLAM HYDROCHLORIDE 2 MG: 1 INJECTION, SOLUTION INTRAMUSCULAR; INTRAVENOUS at 11:42

## 2024-02-07 RX ADMIN — FENTANYL CITRATE 50 MCG: 50 INJECTION, SOLUTION INTRAMUSCULAR; INTRAVENOUS at 12:21

## 2024-02-07 ASSESSMENT — PAIN - FUNCTIONAL ASSESSMENT

## 2024-02-07 ASSESSMENT — PAIN SCALES - GENERAL
PAINLEVEL_OUTOF10: 6
PAINLEVEL_OUTOF10: 6
PAINLEVEL_OUTOF10: 8
PAINLEVEL_OUTOF10: 7
PAINLEVEL_OUTOF10: 6
PAINLEVEL_OUTOF10: 7
PAINLEVEL_OUTOF10: 9
PAINLEVEL_OUTOF10: 6
PAINLEVEL_OUTOF10: 6
PAINLEVEL_OUTOF10: 9
PAINLEVEL_OUTOF10: 9
PAINLEVEL_OUTOF10: 8
PAINLEVEL_OUTOF10: 6
PAINLEVEL_OUTOF10: 6
PAINLEVEL_OUTOF10: 8
PAINLEVEL_OUTOF10: 6
PAINLEVEL_OUTOF10: 0 - NO PAIN

## 2024-02-07 ASSESSMENT — PAIN DESCRIPTION - LOCATION
LOCATION: PENIS

## 2024-02-07 ASSESSMENT — COLUMBIA-SUICIDE SEVERITY RATING SCALE - C-SSRS
1. IN THE PAST MONTH, HAVE YOU WISHED YOU WERE DEAD OR WISHED YOU COULD GO TO SLEEP AND NOT WAKE UP?: NO
6. HAVE YOU EVER DONE ANYTHING, STARTED TO DO ANYTHING, OR PREPARED TO DO ANYTHING TO END YOUR LIFE?: NO
2. HAVE YOU ACTUALLY HAD ANY THOUGHTS OF KILLING YOURSELF?: NO

## 2024-02-07 NOTE — ANESTHESIA PREPROCEDURE EVALUATION
Patient: Lennox Domingo    Procedure Information       Date/Time: 02/07/24 1030    Procedure: Penile Prosthesis Insertion    Location: [facility] / [facility]    Surgeons: Fili Mancilla MD            Relevant Problems   Cardiovascular   (+) Afib (CMS/HCC)   (+) Hypertension   (+) Paroxysmal atrial fibrillation with rapid ventricular response (CMS/HCC)      Neuro/Psych   (+) Lumbar radiculopathy      Pulmonary   (+) Asthma      Musculoskeletal   (+) Chronic low back pain       Clinical information reviewed:   Tobacco  Allergies    Med Hx  Surg Hx   Fam Hx  Soc Hx        NPO/Void Status  Date of Last Liquid: 02/07/24  Time of Last Liquid: 0700  Date of Last Solid: 02/06/24  Time of Last Solid: 2100           Past Medical History:   Diagnosis Date    Asthma     Cardiology follow-up encounter 11/06/2023    James C Ramicone, DO    Erectile dysfunction after radical prostatectomy     Hypertension     Lumbar radiculopathy     Paroxysmal atrial fibrillation (CMS/HCC)     Prostate cancer (CMS/HCC)     Sleep apnea     does not use his CPAP      Past Surgical History:   Procedure Laterality Date    ABLATION OF DYSRHYTHMIC FOCUS  2014    Catheter Ablation    CYSTOSCOPY  02/10/2022    PROSTATECTOMY  2021    URINARY SPHINCTER IMPLANT  07/19/2022     Social History     Tobacco Use    Smoking status: Some Days     Types: Cigars    Smokeless tobacco: Never   Vaping Use    Vaping Use: Never used   Substance Use Topics    Alcohol use: Not Currently     Alcohol/week: 3.0 standard drinks of alcohol     Types: 3 Cans of beer per week    Drug use: Never      Current Outpatient Medications   Medication Instructions    albuterol 2.5 mg /3 mL (0.083 %) nebulizer solution inhale contents of 1 vial ( 3 milliliters ) in nebulizer by mouth...  (REFER TO PRESCRIPTION NOTES).    amLODIPine (NORVASC) 10 mg, oral, Daily    chlorhexidine (Peridex) 0.12 % solution 15 mL, Mouth/Throat, 2 times daily, Use night before surgery and morning of  surgery Swish and spit    doxazosin (CARDURA) 1 mg, oral, Daily    flecainide (TAMBOCOR) 100 mg, oral, 2 times daily    furosemide (LASIX) 20 mg, oral, Daily    hydrALAZINE (APRESOLINE) 50 mg, oral, 3 times daily    metoprolol tartrate (LOPRESSOR) 50 mg, oral, 2 times daily    olmesartan (BENICAR) 40 mg, oral, Daily    Trelegy Ellipta 200-62.5-25 mcg blister with device INHALE ONE INHALATION BY MOUTH ONCE DAILY      Allergies   Allergen Reactions    Shellfish Derived Anaphylaxis    Iodine Unknown        Chemistry    Lab Results   Component Value Date/Time     01/31/2024 1355    K 4.0 01/31/2024 1355     01/31/2024 1355    CO2 32 01/31/2024 1355    BUN 17 01/31/2024 1355    CREATININE 1.22 01/31/2024 1355    Lab Results   Component Value Date/Time    CALCIUM 9.2 01/31/2024 1355    ALKPHOS 48 01/31/2024 1355    AST 13 01/31/2024 1355    ALT 14 01/31/2024 1355    BILITOT 0.4 01/31/2024 1355          Lab Results   Component Value Date/Time    WBC 8.5 01/31/2024 1355    HGB 13.4 (L) 01/31/2024 1355    HCT 42.3 01/31/2024 1355     01/31/2024 1355     Lab Results   Component Value Date/Time    PROTIME 10.6 01/31/2024 1355    INR 0.9 01/31/2024 1355     Encounter Date: 01/31/24   ECG 12 lead (Clinic Performed)   Result Value    Ventricular Rate 71    Atrial Rate 71    TN Interval 188    QRS Duration 108    QT Interval 416    QTC Calculation(Bazett) 452    P Axis 18    R Axis 83    T Axis 26    QRS Count 12    Q Onset 225    P Onset 131    P Offset 191    T Offset 433    QTC Fredericia 440    Narrative    Normal sinus rhythm  Normal ECG  When compared with ECG of 17-NOV-2023 09:56,  No significant change was found  Confirmed by Austin Oshea ([id number]) on 1/31/2024 3:55:44 PM     No results found for this or any previous visit from the past 1095 days.  Echo  5/1/2023@[facility]:  EF normal, normal RV systolic function, no valvular dysfunction.    Visit Vitals  /83   Pulse 64   Temp 36.5 °C (97.7 °F)  "(Temporal)   Resp 17   Ht 1.88 m (6' 2\")   Wt (!) 152 kg (335 lb 1.6 oz)   SpO2 95%   BMI 43.02 kg/m²   Smoking Status Some Days   BSA 2.82 m²        Physical Exam    Airway  Mallampati: III  TM distance: >3 FB  Neck ROM: full     Cardiovascular   Rhythm: regular  Rate: normal     Dental - normal exam     Pulmonary   Breath sounds clear to auscultation     Abdominal - normal exam       Other findings: 7/2022: Difficult mask ventilation, elective glidescope 4, grade 1, 1 attempt.           Anesthesia Plan    History of general anesthesia?: yes  History of complications of general anesthesia?: no    ASA 3     general   (General with LMA)  intravenous induction   Postoperative administration of opioids is intended.  Trial extubation is planned.  Anesthetic plan and risks discussed with patient.    Plan discussed with CRNA and CAA.        "

## 2024-02-07 NOTE — PERIOPERATIVE NURSING NOTE
Pt arrives to phase II stable, vss. BRENT drain drained and brent instructions given to pt and wife. Other discharge instructions given to pt and family, no further questions at this time. Incision and dressing clean dry and intact. Pt dressed without difficulty. Iv removed

## 2024-02-07 NOTE — DISCHARGE INSTRUCTIONS
Post-Operative Instructions: Penile Prosthesis     Diet  You may experience nausea (a side effect of anesthesia) this usually goes away in 24 hours. We recommend drinking fluids and having a light meal in the first 24 hours after surgery then resuming your regular diet.     Activity  No strenuous exercise, work, or any activity involving lifting, pulling, or pushing over 15 pounds for 4-6 weeks after surgery  We recommend you walk for 10 minutes out of every hour you are awake to help prevent the possibility of blood clot formation in your legs  No driving for 1 week or while taking prescription pain medication  No sexual activity of any kind for 6 weeks and until cleared by our office  You will go home with the device partially inflated. You should wear an athletic supporter (jock strap) or tight fitting jockey shorts with your penis lying against your lower abdomen pointing towards your head for the first week after surgery. This will improve pain control and allow for better healing.  You may shower 48 hours after surgery, allowing the shower water to clean the incisional area. Avoid scrubbing the incision.  You may take tub baths after your incision is healed.    Swelling/Bruising  You will experience swelling and bruising of the scrotum/penis after surgery  To control swelling and reduce pain, we recommend you wear an athletic supporter (jock strap) or tight-fitting shorts with your penis lying against your lower abdomen pointing toward your head for the first week after surgery  We recommend you apply ice to the scrotum for the first 2-4 days after surgery. You should be lying down or sitting in a recliner with the legs raised, position a hand towel under the scrotum (for elevation) and apply ice (frozen peas or corn work well) with a washcloth between the skin and the ice pack. You should alternate 15-30 minutes of ice on then 15-30 minutes of no ice throughout the day while awake  Your scrotum may initially  look black and blue an overtime with turn yellowish. This will go away on its own and your normal skin color will return    Pain  You will be given a prescription for pain medication to be taken as needed. The application of ice, wearing the athletic supporter and controlling activity all will help to reduce discomfort. If you feel you do not need prescription pain medication, you may use Tylenol or Motrin. Most patients are off prescription pain medication within 1-2 weeks after surgery.    Medication  - Please do not start any blood thinner medication until cleared by provider at drain removal.     Self Inflation:  It is possible for the prosthesis to spontaneously inflate after surgery, this is not an emergency. Take your pain medication for any discomfort that this may cause and call our office.    When to Call the Surgeon:  Fever higher than 102°F  Repeated vomiting with inability to keep down fluids  Severe pain not controlled with medication  Separation of your incision   Pus like discharge, increasing redness, increasing pain of the incision site   For questions or problems, call our office at [phone]  Evenings and weekends: call the hospital  [phone] and ask for the [id number].  In case of emergency, call [phone].    Follow-Up Appointment:  You have a scheduled appointment for drain removal in the coming days. Please call [phone] if you need to reschedule.

## 2024-02-07 NOTE — OP NOTE
Penile Prosthesis Insertion Operative Note     Date: 2024  OR Location: [facility] OR    Name: Lennox Domingo, : 1970, Age: 53 y.o., MRN: [id number], Sex: male    Diagnosis  Pre-op Diagnosis     * Erectile dysfunction after radical prostatectomy [N52.31] Post-op Diagnosis     * Erectile dysfunction after radical prostatectomy [N52.31]     Procedures  Penile Prosthesis Insertion  07281 - CO INSJ MULTI-COMPONENT INFLATABLE PENILE PROSTH    Insertion of 3 piece inflatable penile prosthesis, pharmacologic artifical erection, left pudendal nerve block.  Surgeons      * Fili Mancilla - Primary    Resident/Fellow/Other Assistant:  Surgeon(s) and Role:     * Silviano Hodges MD - Resident - Assisting    Procedure Summary  Anesthesia: General  ASA: III  Anesthesia Staff: Anesthesiologist: Maurizio Flynn MD  C-AA: ARNOLD Doss  Estimated Blood Loss: 30mL  Intra-op Medications:   Administrations occurring from 1030 to 1230 on 24:   Medication Name Total Dose   BUPivacaine HCl (Marcaine) 0.5 % (5 mg/mL) 30 mL, dexAMETHasone (Decadron) 4 mg syringe 20 mL   bupivacaine PF (Marcaine) 0.25 % (2.5 mg/mL) 20 mL in sodium chloride 0.9% 30 mL syringe 50 mL   lactated Ringer's infusion Cannot be calculated   fluconazole in NaCl (iso-osm) (Diflucan) IVPB 200 mg 200 mg              Anesthesia Record               Intraprocedure I/O Totals          Intake    lactated Ringer's infusion 800.00 mL    Total Intake 800 mL       Output    Est. Blood Loss 15 mL    Total Output 15 mL       Net    Net Volume 785 mL          Specimen: No specimens collected     Staff:   Circulator: Lisset Negron RN  Relief Circulator: Natalie Cerda RN  Scrub Person: [facility]         Drains and/or Catheters:   Closed/Suction Drain Anterior;Midline Abdomen Bulb 10 Fr. (Active)       Tourniquet Times: none        Implants:  Implants       Type Name Action Serial No.      General Urology ACCESSORY KIT, PENILE, INFLATABLE, [facility] 700 - [id number]  Implanted      General Urology RESERVOIR, CONCEAL LOW PROFILE, FLAT, 100ML - [id number] Implanted      General Urology PROSTHESIS, PENILE, INFRAPUBIC,CX MS 21CM IP IZ,  PRECONNECT - [id number] Implanted      General Urology EXTENDER, REAR TIP 1CM - [id number] Implanted               Findings:  CX 21cm +1cm RTE, 100 mL conceal reservoir left submuscular    Indications: Lennox Domingo is an 53 y.o. male who is having surgery for Erectile dysfunction after radical prostatectomy [N52.31].     The patient was seen in the preoperative area. The risks, benefits, complications, treatment options, non-operative alternatives, expected recovery and outcomes were discussed with the patient. The possibilities of reaction to medication, pulmonary aspiration, injury to surrounding structures, bleeding, recurrent infection, the need for additional procedures, failure to diagnose a condition, and creating a complication requiring transfusion or operation were discussed with the patient. The patient concurred with the proposed plan, giving informed consent.  The site of surgery was properly noted/marked if necessary per policy. The patient has been actively warmed in preoperative area. Preoperative antibiotics have been ordered and given within 2 hours of incision. Venous thrombosis prophylaxis have been ordered including bilateral sequential compression devices    Procedure Details:   PREOPERATIVE DIAGNOSIS: Erectile Dysfunction  POSTOPERATIVE DIAGNOSIS: Erectile Dysfunction  PROCEDURE:  Placement of  CX 3-piece inflatable penile prosthesis.                                                  Cylinders:           21 cm                                                  Rear tips:            1 cm bilaterally                                                  Reservoir:           100 mL    Site: left submuscular    INDICATIONS:  The patient is a 54 y/o M with a history of prostate cancer s/p RALP complicated by erectile  dysfunction refractory to conservative management. Patient decided to undergo definitive surgical management with inflatable penile prosthesis. The risks (infection, erosion, decrease penile size, damage to other organs, device mechanical failure and need for replacement, etc) and benefits were explained and all questions and concerns were addressed.     PROCEDURE:  The patient was placed on table in supine position.  Anesthesia was administered without incident.  Genitalia was then shaved and then prepped with ChloraPrep after a Hibiclens wash.  A left pudendal nerve block was performed with 20 cc of 0.25% marcaine and decadron mix. Right block was not done since the patient had tubing and the pump of his artifical sphincter on that side. An artificial erection was then induced using injection of 30 cc of 0.25% Marcaine and 20 cc of injectable saline.     There was no significant Peyronie disease or curvature.  A transverse infrapubic incision was then made using a scalpel and electrocautery and dissected down through dartos down to the corporal bodies bilaterally which were cleared bluntly.   2-0 Vicryl stay sutures were placed in each corporal body, 1 medially and 1 laterally assuring that we were lateral to the dorsal nerve bundles.  A 1.5 cm corporotomy was then made between the stay stitches first on the left side and then on the right.  We then dilated the corpora using 11 Lopez proximally and distally and 12 and 13 proximally and dilated quite nicely. Using the Fbay device, we then measured our corpora on the left side, it was 12cm proximal and 10cm distal for a total of 22cm. On the right side, it was also 12cm proximal and 10cm distal for a total of 22cm.  The wound was then copiously irrigated with antibiotic irrigation.     Decision was then made to prep the  CX 21cm with 1cm rear-tip extenders as well as 100cc reservoir.     We then turned our attention to development of the submuscular space  for the reservoir. The decision was then to go on the left side. We incised along the left inguinal region until we were at the level of the external oblique. We placed stay sutures in the fascia and then dissected until the submuscular space was in view. We then placed the reservoir and were able to get a nice high 100 ml reservoir which was filled with 90cc and seated quite nicely.     We then turned our attention to placement of the cylinders.  Once they had been prepped using Alton needle and Faby device, the cylinders were deployed within the corporal bodies and it seated quite nicely.  A surrogate reservoir test was then performed and tips were symmetrical in mid glans.  No evidence of any crossover perforation or any other pathology. Implant was then deflated.  The tubing from the reservoir was connected to the pump using the quick connector set.  Our pre-placed corporotomy stitches were then tied down with nice closure of the corpora.  A subdartos pouch was created for placement of the pump  The pump was placed in a dependent position. A RHINA drain was placed into the right groin.  We then proceeded with wound closure for both the infrapubic and inguinal incision.  The deeper layers were closed with 2-0 Vicryl in multiple layers.  The skin was closed with 4-0 Monocryl.  Skin glue was then applied.  Wrapped fluffs and scrotal support were then applied. The patient was awakened and transferred to PACU in stable condition.     Complications:  None; patient tolerated the procedure well.    Disposition: PACU - hemodynamically stable.  Condition: stable         Attending Attestation:     Fili Mancilla  Phone Number: [phone]

## 2024-02-07 NOTE — ANESTHESIA PROCEDURE NOTES
Airway  Date/Time: 2/7/2024 11:54 AM  Urgency: elective    Airway not difficult    Staffing  Performed: CAA   Authorized by: Maurizio Flynn MD    Performed by: ARNOLD Doss  Patient location during procedure: OR    Indications and Patient Condition  Indications for airway management: anesthesia  Spontaneous Ventilation: absent  Sedation level: deep  Preoxygenated: yes  Patient position: sniffing  MILS not maintained throughout  Mask difficulty assessment: 0 - not attempted  Planned trial extubation    Final Airway Details  Final airway type: supraglottic airway      Successful airway: Supraglottic airway: IGel.  Size 5     Number of attempts at approach: 1

## 2024-02-07 NOTE — ANESTHESIA PROCEDURE NOTES
Airway  Date/Time: 2/7/2024 12:08 PM  Urgency: elective    Airway not difficult    Staffing  Performed: ARNOLD   Authorized by: Maurizio Flynn MD    Performed by: ARNOLD Doss  Patient location during procedure: OR    Indications and Patient Condition  Indications for airway management: anesthesia and airway protection  Spontaneous Ventilation: absent  Sedation level: deep  Preoxygenated: yes  Patient position: sniffing  MILS not maintained throughout  Mask difficulty assessment: 0 - not attempted  Planned trial extubation    Final Airway Details  Final airway type: endotracheal airway      Successful airway: ETT  Cuffed: yes   Successful intubation technique: video laryngoscopy  Facilitating devices/methods: intubating stylet  Endotracheal tube insertion site: oral  Blade: Cici  Blade size: #4  ETT size (mm): 7.5  Cormack-Lehane Classification: grade I - full view of glottis  Placement verified by: chest auscultation, capnometry and palpation of cuff   Measured from: teeth  ETT to teeth (cm): 22  Number of attempts at approach: 1    Additional Comments  LMA removed and replaced by ETT due to insufficient ventilation. Intubation uneventful.

## 2024-02-07 NOTE — ANESTHESIA POSTPROCEDURE EVALUATION
Patient: Lennox Domingo    Procedure Summary       Date: 02/07/24 Room / Location: [facility] OR 07 / [facility] OR    Anesthesia Start: 1142 Anesthesia Stop: 1321    Procedure: Penile Prosthesis Insertion (Groin) Diagnosis:       Erectile dysfunction after radical prostatectomy      (Erectile dysfunction after radical prostatectomy [N52.31])    Surgeons: Fili Mancilla MD Responsible Provider: Maurizio Flynn MD    Anesthesia Type: general ASA Status: 3            Anesthesia Type: general    Vitals Value Taken Time   /85 02/07/24 1529   Temp 36.5 °C (97.7 °F) 02/07/24 1529   Pulse 72 02/07/24 1529   Resp 14 02/07/24 1529   SpO2 98 % 02/07/24 1529       Anesthesia Post Evaluation    Patient location during evaluation: PACU  Patient participation: complete - patient participated  Level of consciousness: awake and alert  Pain management: adequate  Airway patency: patent  Cardiovascular status: acceptable and hemodynamically stable  Respiratory status: acceptable, spontaneous ventilation and nonlabored ventilation  Hydration status: acceptable  Postoperative Nausea and Vomiting: none        There were no known notable events for this encounter.

## 2024-02-08 ASSESSMENT — PAIN SCALES - GENERAL: PAINLEVEL_OUTOF10: 5 - MODERATE PAIN

## 2024-02-09 ENCOUNTER — OFFICE VISIT (OUTPATIENT)
Dept: UROLOGY | Facility: HOSPITAL | Age: 54
End: 2024-02-09
Payer: COMMERCIAL

## 2024-02-09 DIAGNOSIS — Z96.0 S/P INSERTION OF PENILE IMPLANT: Primary | ICD-10-CM

## 2024-02-09 PROCEDURE — 3008F BODY MASS INDEX DOCD: CPT | Performed by: UROLOGY

## 2024-02-09 PROCEDURE — 99024 POSTOP FOLLOW-UP VISIT: CPT | Performed by: UROLOGY

## 2024-02-09 NOTE — PROGRESS NOTES
FUV    Last seen - 2/7/24     HISTORY OF PRESENT ILLNESS:   Lennox Domingo is a 53 y.o. male who is being seen today for POV    h/o Pca s/p RALP with Dr. Martinez.  PSA UDT     Pt with h/o bladder neck contracture s/p BNI Feb 2022.  Had AUS in sept 2022.  Leakage improved but still has some bothersome incontinence.       Pt with severe ED. Failed PDE5i, not able to do ICI    Pt had   CX 21cm +1cm RTE, 100 mL conceal reservoir left submuscular through counter incision on 2/7/24.  Discharged home from pacu.      Been doing ok.  No f/c. Urinating ok.  Pain managable    PAST MEDICAL HISTORY:  Past Medical History:   Diagnosis Date    Asthma     Cardiology follow-up encounter 11/06/2023    James C Ramicone, DO    Erectile dysfunction after radical prostatectomy     Hypertension     Lumbar radiculopathy     Paroxysmal atrial fibrillation (CMS/HCC)     Prostate cancer (CMS/HCC)     Sleep apnea     does not use his CPAP       PAST SURGICAL HISTORY:  Past Surgical History:   Procedure Laterality Date    ABLATION OF DYSRHYTHMIC FOCUS  2014    Catheter Ablation    CYSTOSCOPY  02/10/2022    PROSTATECTOMY  2021    URINARY SPHINCTER IMPLANT  07/19/2022        ALLERGIES:   Allergies   Allergen Reactions    Shellfish Derived Anaphylaxis    Iodine Unknown        MEDICATIONS:   Current Outpatient Medications   Medication Instructions    acetaminophen (TYLENOL) 650 mg, oral, Every 6 hours PRN    albuterol 2.5 mg /3 mL (0.083 %) nebulizer solution inhale contents of 1 vial ( 3 milliliters ) in nebulizer by mouth...  (REFER TO PRESCRIPTION NOTES).    amLODIPine (NORVASC) 10 mg, oral, Daily    celecoxib (CELEBREX) 400 mg, oral, Daily    chlorhexidine (Peridex) 0.12 % solution 15 mL, Mouth/Throat, 2 times daily, Use night before surgery and morning of surgery Swish and spit    doxazosin (CARDURA) 1 mg, oral, Daily    flecainide (TAMBOCOR) 100 mg, oral, 2 times daily    furosemide (LASIX) 20 mg, oral, Daily    gabapentin (NEURONTIN)  300 mg, oral, 3 times daily    hydrALAZINE (APRESOLINE) 50 mg, oral, 3 times daily    metoprolol tartrate (LOPRESSOR) 50 mg, oral, 2 times daily    olmesartan (BENICAR) 40 mg, oral, Daily    oxyCODONE (ROXICODONE) 5 mg, oral, Every 6 hours PRN    polyethylene glycol (GLYCOLAX, MIRALAX) 17 g, oral, Daily    sulfamethoxazole-trimethoprim (Bactrim DS) 800-160 mg tablet 1 tablet, oral, 2 times daily    Trelegy Ellipta 200-62.5-25 mcg blister with device INHALE ONE INHALATION BY MOUTH ONCE DAILY        PHYSICAL EXAM:  There were no vitals taken for this visit.  Constitutional: Patient appears well-developed and well-nourished. No distress.    Pulmonary/Chest: Effort normal. No respiratory distress.   Abdominal: Soft, ND NT  : Tips symmetrical and mid glans, pump midline and dependent, incisions healing well.  Drain with ss output  Musculoskeletal: Normal range of motion.    Neurological: Alert and oriented to person, place, and time.  Psychiatric: Normal mood and affect. Behavior is normal. Thought content normal.      Labs  Lab Results   Component Value Date    PSA <0.1 05/11/2022     Lab Results   Component Value Date    GFRMALE 74 08/10/2023     Lab Results   Component Value Date    CREATININE 1.22 01/31/2024     Lab Results   Component Value Date    CHOL 129 08/10/2023     Lab Results   Component Value Date    HDL 50.0 08/10/2023     Lab Results   Component Value Date    CHHDL 2.6 08/10/2023     Lab Results   Component Value Date    LDLF 66 08/10/2023     Lab Results   Component Value Date    VLDL 13 08/10/2023     Lab Results   Component Value Date    TRIG 66 08/10/2023     Lab Results   Component Value Date    HGBA1C 5.8 (H) 01/31/2024     Lab Results   Component Value Date    HCT 42.3 01/31/2024       Imaging    Procedures      Assessment:      1. S/P insertion of penile implant          Lennox Domingo is a 53 y.o. male here for FUV     Plan:   1)  Drain removed  2) Local wound care  3) Good scrotal  support    FU in 2wks, sooner if needed

## 2024-02-22 ENCOUNTER — OFFICE VISIT (OUTPATIENT)
Dept: UROLOGY | Facility: HOSPITAL | Age: 54
End: 2024-02-22
Payer: COMMERCIAL

## 2024-02-22 DIAGNOSIS — Z96.0 S/P INSERTION OF PENILE IMPLANT: Primary | ICD-10-CM

## 2024-02-22 PROCEDURE — 3008F BODY MASS INDEX DOCD: CPT | Performed by: UROLOGY

## 2024-02-22 PROCEDURE — 99024 POSTOP FOLLOW-UP VISIT: CPT | Performed by: UROLOGY

## 2024-02-22 NOTE — PROGRESS NOTES
POV    Last seen - 2/9/24     HISTORY OF PRESENT ILLNESS:   Lennox Domingo is a 53 y.o. male who is being seen today for POV    h/o Pca s/p RALP with Dr. Martinez.  PSA UDT     Pt with h/o bladder neck contracture s/p BNI Feb 2022.  Had AUS in sept 2022.  Leakage improved but still has some bothersome incontinence.       Pt with severe ED. Failed PDE5i, not able to do ICI    Pt had   CX 21cm +1cm RTE, 100 mL conceal reservoir left submuscular through counter incision on 2/7/24.  Discharged home from pacu.      Been doing ok.  No f/c. Urinating ok.  Pain managable    PAST MEDICAL HISTORY:  Past Medical History:   Diagnosis Date    Asthma     Cardiology follow-up encounter 11/06/2023    James C Ramicone, DO    Erectile dysfunction after radical prostatectomy     Hypertension     Lumbar radiculopathy     Paroxysmal atrial fibrillation (CMS/HCC)     Prostate cancer (CMS/HCC)     Sleep apnea     does not use his CPAP       PAST SURGICAL HISTORY:  Past Surgical History:   Procedure Laterality Date    ABLATION OF DYSRHYTHMIC FOCUS  2014    Catheter Ablation    CYSTOSCOPY  02/10/2022    PROSTATECTOMY  2021    URINARY SPHINCTER IMPLANT  07/19/2022        ALLERGIES:   Allergies   Allergen Reactions    Shellfish Derived Anaphylaxis    Iodine Unknown        MEDICATIONS:   Current Outpatient Medications   Medication Instructions    acetaminophen (TYLENOL) 650 mg, oral, Every 6 hours PRN    albuterol 2.5 mg /3 mL (0.083 %) nebulizer solution inhale contents of 1 vial ( 3 milliliters ) in nebulizer by mouth...  (REFER TO PRESCRIPTION NOTES).    amLODIPine (NORVASC) 10 mg, oral, Daily    chlorhexidine (Peridex) 0.12 % solution 15 mL, Mouth/Throat, 2 times daily, Use night before surgery and morning of surgery Swish and spit    doxazosin (CARDURA) 1 mg, oral, Daily    flecainide (TAMBOCOR) 100 mg, oral, 2 times daily    furosemide (LASIX) 20 mg, oral, Daily    gabapentin (NEURONTIN) 300 mg, oral, 3 times daily    hydrALAZINE  (APRESOLINE) 50 mg, oral, 3 times daily    metoprolol tartrate (LOPRESSOR) 50 mg, oral, 2 times daily    olmesartan (BENICAR) 40 mg, oral, Daily    oxyCODONE (ROXICODONE) 5 mg, oral, Every 6 hours PRN    polyethylene glycol (GLYCOLAX, MIRALAX) 17 g, oral, Daily    Trelegy Ellipta 200-62.5-25 mcg blister with device INHALE ONE INHALATION BY MOUTH ONCE DAILY        PHYSICAL EXAM:  There were no vitals taken for this visit.  Constitutional: Patient appears well-developed and well-nourished. No distress.    Pulmonary/Chest: Effort normal. No respiratory distress.   Abdominal: Soft, ND NT  : Tips symmetrical and mid glans, pump midline and dependent, incisions healing well.  Musculoskeletal: Normal range of motion.    Neurological: Alert and oriented to person, place, and time.  Psychiatric: Normal mood and affect. Behavior is normal. Thought content normal.      Labs  Lab Results   Component Value Date    PSA <0.1 05/11/2022     Lab Results   Component Value Date    GFRMALE 74 08/10/2023     Lab Results   Component Value Date    CREATININE 1.22 01/31/2024     Lab Results   Component Value Date    CHOL 129 08/10/2023     Lab Results   Component Value Date    HDL 50.0 08/10/2023     Lab Results   Component Value Date    CHHDL 2.6 08/10/2023     Lab Results   Component Value Date    LDLF 66 08/10/2023     Lab Results   Component Value Date    VLDL 13 08/10/2023     Lab Results   Component Value Date    TRIG 66 08/10/2023     Lab Results   Component Value Date    HGBA1C 5.8 (H) 01/31/2024     Lab Results   Component Value Date    HCT 42.3 01/31/2024     Assessment:      1. S/P insertion of penile implant          Lennox Domingo is a 53 y.o. male here for FUV     Plan:   Cycle daily  Local wound care    FU in 1m, sooner if needed

## 2024-02-28 ENCOUNTER — NUTRITION (OUTPATIENT)
Dept: ENDOCRINOLOGY | Facility: CLINIC | Age: 54
End: 2024-02-28
Payer: COMMERCIAL

## 2024-02-28 VITALS — WEIGHT: 315 LBS | BODY MASS INDEX: 40.43 KG/M2 | HEIGHT: 74 IN

## 2024-02-28 DIAGNOSIS — E66.01 CLASS 3 SEVERE OBESITY DUE TO EXCESS CALORIES WITH SERIOUS COMORBIDITY AND BODY MASS INDEX (BMI) OF 40.0 TO 44.9 IN ADULT (MULTI): ICD-10-CM

## 2024-02-28 DIAGNOSIS — Z71.3 DIETARY COUNSELING: Primary | ICD-10-CM

## 2024-02-28 PROCEDURE — 97802 MEDICAL NUTRITION INDIV IN: CPT | Performed by: DIETITIAN, REGISTERED

## 2024-02-28 NOTE — PROGRESS NOTES
"Initial Nutrition Assessment    Patient was referred to nutrition by Brianna Corral for weight management/desire to lose weight. Other PMHX significant for HTN, A-fib, REJI and prostate CA.     Diet recall reveals a consistent meal pattern with rare skipped meals, as well as recent implementation of better overall controlled portions and reduction of increased sweets/snacking at night. As a result of these efforts, pt with a recent 16 lbs intentional weight loss. If all efforts maintained along with further efforts to reduce some of the remaining calorically dense foods and sugar sweetened beverages, continued successful weight loss likely. See all interventions/recommendations below as discussed during visit this day.      Patient reported symptoms: Difficulty losing weight    Anthropometrics:  Height:   Ht Readings from Last 1 Encounters:   02/07/24 1.88 m (6' 2\")      Weight:   Wt Readings from Last 10 Encounters:   02/07/24 (!) 152 kg (335 lb 1.6 oz)   02/01/24 (!) 152 kg (334 lb 8 oz)   01/31/24 (!) 151 kg (332 lb 14.3 oz)   11/06/23 150 kg (330 lb)   09/27/23 149 kg (329 lb 8 oz)   08/11/23 (!) 151 kg (333 lb 8 oz)   06/09/23 149 kg (327 lb 7 oz)   05/22/23 148 kg (327 lb)   01/25/23 148 kg (326 lb)   12/09/22 (!) 144 kg (317 lb 5 oz)      Current BMI:   BMI Readings from Last 1 Encounters:   02/07/24 43.02 kg/m²        Labs:  Lab Results   Component Value Date    HGBA1C 5.8 (H) 01/31/2024      Lab Results   Component Value Date    CHOL 129 08/10/2023    CHOL 143 10/21/2022    CHOL 153 10/11/2021     Lab Results   Component Value Date    HDL 50.0 08/10/2023    HDL 52.3 10/21/2022    HDL 52.2 10/11/2021     No results found for: \"LDLCALC\"  Lab Results   Component Value Date    TRIG 66 08/10/2023    TRIG 87 10/21/2022    TRIG 110 10/11/2021       Malnutrition Screening:  Significant Unintentional weight loss: No  Eating less than 75% of usual intake for more than 2 weeks: No  Potential Signs of Inflammation: " No    Recommended Malnutrition Diagnosis: No malnutrition identified    Diet Recall-  Breakfast- bagel and egg sandwich  Lunch- chicken salad sandwich with chips  Dinner- fish OR beef OR chicken OR turkey with various starches and vegetables   Daily Snacks- occasional sweet but has cut back significantly  Beverages- coffee in am, water throughout the day (48-64 ounces daily) and regular juice (increased amounts by report but uncertain of exact amounts   Alcohol- none recently   Physical Activity- limited due to recent surgery     Other pertinent patient reported Information:  - Was snacking in the middle of the night on sweets and has eliminated this which has significantly reduced overall intakes  - Has also worked hard to reduce overall portions at meals    Nutrition Diagnosis: Food and nutrition-related knowledge deficit related to lack of prior exposure to information as evidenced by BMI above normative standard for age and gender.    Readiness to Learn:  Cognitive ability: Alert and oriented  Motivation to learn: Interested  Family Support: Unable to assess- family not present  Instruction provided to: Patient  Patient learns best by: Multiple methods  Factors affecting learning: None   Physical limitations affecting learning: None    Education Materials Provided:   Your Mediterranean Meal Plan Booklet    Nutrition Interventions/Recommendations for 2/28/2024:  - Please refer to your book entitled: Your Mediterranean Meal Plan, and follow Mediterranean Diet guidelines as discussed.  - The Healthy Plate style of eating can be a helpful tool for incorporating healthy balanced meals in appropriate portions. (Healthy Plate: Start with a 9-inch diameter plate. Fill 1/2 the plate with non-starchy vegetables, 1/4 of the plate with whole grains or starchy vegetables, and 1/4 of the plate with a lean source of protein.   - Consider pre-planning all healthy meals for the week. Refer to your book for menus and recipes to  get you started.  - Aim for 64 ounces of water daily and please consider elimination of juice/sugar sweetened beverages.  - Incorporate weekly exercise again per MD approval following recovery from recent surgery.  - Follow-up with nutrition in June as scheduled.      Nutrition Monitoring & Evaluation: Weight loss of 1-2 lbs per week and adherence to recommendations and patient stated goals    Need for follow-up:  June    Referred by: Brianna Corral Billing Type: Medical Nutrition Assessment, each 15 min increment, for 3 increments.    SIGNATURE:   Renae Morgan RD, LD, [facility]                                                             DATE:   2/28/2024

## 2024-02-28 NOTE — PATIENT INSTRUCTIONS
- Please refer to your book entitled: Your Mediterranean Meal Plan, and follow Mediterranean Diet guidelines as discussed.  - The Healthy Plate style of eating can be a helpful tool for incorporating healthy balanced meals in appropriate portions. (Healthy Plate: Start with a 9-inch diameter plate. Fill 1/2 the plate with non-starchy vegetables, 1/4 of the plate with whole grains or starchy vegetables, and 1/4 of the plate with a lean source of protein.   - Consider pre-planning all healthy meals for the week. Refer to your book for menus and recipes to get you started.  - Aim for 64 ounces of water daily and please consider elimination of juice/sugar sweetened beverages.  - Incorporate weekly exercise again per MD approval following recovery from recent surgery.  - Follow-up with nutrition in June as scheduled.

## 2024-03-07 ENCOUNTER — TELEPHONE (OUTPATIENT)
Dept: PRIMARY CARE | Facility: CLINIC | Age: 54
End: 2024-03-07
Payer: COMMERCIAL

## 2024-03-07 DIAGNOSIS — J45.20 MILD INTERMITTENT ASTHMA WITHOUT COMPLICATION (HHS-HCC): Primary | ICD-10-CM

## 2024-03-07 RX ORDER — PREDNISONE 10 MG/1
TABLET ORAL
Qty: 28 TABLET | Refills: 0 | Status: SHIPPED | OUTPATIENT
Start: 2024-03-07

## 2024-03-07 NOTE — TELEPHONE ENCOUNTER
Pt called and said he has been having a Bad Cough and congestion for 4 days. I would schedule, but you are booked until Monday. Would you be willing to call something in for this, or would he need to be seen?

## 2024-03-21 ENCOUNTER — OFFICE VISIT (OUTPATIENT)
Dept: UROLOGY | Facility: HOSPITAL | Age: 54
End: 2024-03-21
Payer: COMMERCIAL

## 2024-03-21 DIAGNOSIS — Z96.0 S/P INSERTION OF PENILE IMPLANT: Primary | ICD-10-CM

## 2024-03-21 PROCEDURE — 99024 POSTOP FOLLOW-UP VISIT: CPT | Performed by: UROLOGY

## 2024-03-21 PROCEDURE — 3008F BODY MASS INDEX DOCD: CPT | Performed by: UROLOGY

## 2024-03-21 ASSESSMENT — PAIN SCALES - GENERAL: PAINLEVEL: 0-NO PAIN

## 2024-03-21 NOTE — PROGRESS NOTES
POV    Last seen - 2/22/24     HISTORY OF PRESENT ILLNESS:   Lenonx Domingo is a 53 y.o. male who is being seen today for POV    h/o Pca s/p RALP with Dr. Martinez.  PSA UDT     Pt with h/o bladder neck contracture s/p BNI Feb 2022.  Had AUS in sept 2022.       Pt with severe ED. Failed PDE5i, not able to do ICI    Pt had   CX 21cm +1cm RTE, 100 mL conceal reservoir left submuscular through counter incision on 2/7/24.  Discharged home from pacu.      2/22/24 - Been doing ok.  No f/c. Urinating ok.  Pain managable    3/21/24 - Having issues inflating.  No f/c.  Urinating ok    PAST MEDICAL HISTORY:  Past Medical History:   Diagnosis Date    Asthma     Cardiology follow-up encounter 11/06/2023    James C Ramicone,     Erectile dysfunction after radical prostatectomy     Hypertension     Lumbar radiculopathy     Paroxysmal atrial fibrillation (CMS/HCC)     Prostate cancer (CMS/HCC)     Sleep apnea     does not use his CPAP       PAST SURGICAL HISTORY:  Past Surgical History:   Procedure Laterality Date    ABLATION OF DYSRHYTHMIC FOCUS  2014    Catheter Ablation    CYSTOSCOPY  02/10/2022    PROSTATECTOMY  2021    URINARY SPHINCTER IMPLANT  07/19/2022        ALLERGIES:   Allergies   Allergen Reactions    Shellfish Derived Anaphylaxis    Iodine Unknown        MEDICATIONS:   Current Outpatient Medications   Medication Instructions    acetaminophen (TYLENOL) 650 mg, oral, Every 6 hours PRN    albuterol 2.5 mg /3 mL (0.083 %) nebulizer solution inhale contents of 1 vial ( 3 milliliters ) in nebulizer by mouth...  (REFER TO PRESCRIPTION NOTES).    amLODIPine (NORVASC) 10 mg, oral, Daily    chlorhexidine (Peridex) 0.12 % solution 15 mL, Mouth/Throat, 2 times daily, Use night before surgery and morning of surgery Swish and spit    doxazosin (CARDURA) 1 mg, oral, Daily    flecainide (TAMBOCOR) 100 mg, oral, 2 times daily    furosemide (LASIX) 20 mg, oral, Daily    gabapentin (NEURONTIN) 300 mg, oral, 3 times daily     hydrALAZINE (APRESOLINE) 50 mg, oral, 3 times daily    metoprolol tartrate (LOPRESSOR) 50 mg, oral, 2 times daily    olmesartan (BENICAR) 40 mg, oral, Daily    oxyCODONE (ROXICODONE) 5 mg, oral, Every 6 hours PRN    predniSONE (Deltasone) 10 mg tablet Take 4 tablets for 4 days, then 2 tablets for 4 days, 1 tablet for 4 days    Trelegy Ellipta 200-62.5-25 mcg blister with device INHALE ONE INHALATION BY MOUTH ONCE DAILY        PHYSICAL EXAM:  There were no vitals taken for this visit.  Constitutional: Patient appears well-developed and well-nourished. No distress.    Pulmonary/Chest: Effort normal. No respiratory distress.   Abdominal: Soft, ND NT  : Tips symmetrical and mid glans, pump midline and dependent, incisions healing well.  Musculoskeletal: Normal range of motion.    Neurological: Alert and oriented to person, place, and time.  Psychiatric: Normal mood and affect. Behavior is normal. Thought content normal.      Labs  Lab Results   Component Value Date    PSA <0.1 05/11/2022     Lab Results   Component Value Date    GFRMALE 74 08/10/2023     Lab Results   Component Value Date    CREATININE 1.22 01/31/2024     Lab Results   Component Value Date    CHOL 129 08/10/2023     Lab Results   Component Value Date    HDL 50.0 08/10/2023     Lab Results   Component Value Date    CHHDL 2.6 08/10/2023     Lab Results   Component Value Date    LDLF 66 08/10/2023     Lab Results   Component Value Date    VLDL 13 08/10/2023     Lab Results   Component Value Date    TRIG 66 08/10/2023     Lab Results   Component Value Date    HGBA1C 5.8 (H) 01/31/2024     Lab Results   Component Value Date    HCT 42.3 01/31/2024     Assessment:      1. S/P insertion of penile implant          Lennox Domingo is a 53 y.o. male here for FUV     Plan:   Cycle daily, instructed on proper use  No restrictions    FU in 3m, sooner if needed

## 2024-03-27 ENCOUNTER — LAB (OUTPATIENT)
Dept: LAB | Facility: LAB | Age: 54
End: 2024-03-27
Payer: COMMERCIAL

## 2024-03-27 ENCOUNTER — OFFICE VISIT (OUTPATIENT)
Dept: UROLOGY | Facility: CLINIC | Age: 54
End: 2024-03-27
Payer: COMMERCIAL

## 2024-03-27 ENCOUNTER — TELEPHONE (OUTPATIENT)
Dept: PRIMARY CARE | Facility: CLINIC | Age: 54
End: 2024-03-27

## 2024-03-27 VITALS
SYSTOLIC BLOOD PRESSURE: 160 MMHG | BODY MASS INDEX: 41.86 KG/M2 | HEART RATE: 84 BPM | WEIGHT: 315 LBS | TEMPERATURE: 98.1 F | DIASTOLIC BLOOD PRESSURE: 90 MMHG

## 2024-03-27 DIAGNOSIS — N32.0 BLADDER NECK CONTRACTURE: ICD-10-CM

## 2024-03-27 DIAGNOSIS — N52.31 ERECTILE DYSFUNCTION AFTER RADICAL PROSTATECTOMY: ICD-10-CM

## 2024-03-27 DIAGNOSIS — C61 PROSTATE CANCER (MULTI): ICD-10-CM

## 2024-03-27 DIAGNOSIS — I10 PRIMARY HYPERTENSION: Primary | ICD-10-CM

## 2024-03-27 DIAGNOSIS — Z96.0 S/P INSERTION OF PENILE IMPLANT: ICD-10-CM

## 2024-03-27 DIAGNOSIS — C61 PROSTATE CANCER (MULTI): Primary | ICD-10-CM

## 2024-03-27 LAB — PSA SERPL-MCNC: <0.1 NG/ML

## 2024-03-27 PROCEDURE — 36415 COLL VENOUS BLD VENIPUNCTURE: CPT

## 2024-03-27 PROCEDURE — 3008F BODY MASS INDEX DOCD: CPT | Performed by: STUDENT IN AN ORGANIZED HEALTH CARE EDUCATION/TRAINING PROGRAM

## 2024-03-27 PROCEDURE — G2211 COMPLEX E/M VISIT ADD ON: HCPCS | Performed by: STUDENT IN AN ORGANIZED HEALTH CARE EDUCATION/TRAINING PROGRAM

## 2024-03-27 PROCEDURE — 99214 OFFICE O/P EST MOD 30 MIN: CPT | Performed by: STUDENT IN AN ORGANIZED HEALTH CARE EDUCATION/TRAINING PROGRAM

## 2024-03-27 PROCEDURE — 3080F DIAST BP >= 90 MM HG: CPT | Performed by: STUDENT IN AN ORGANIZED HEALTH CARE EDUCATION/TRAINING PROGRAM

## 2024-03-27 PROCEDURE — 3077F SYST BP >= 140 MM HG: CPT | Performed by: STUDENT IN AN ORGANIZED HEALTH CARE EDUCATION/TRAINING PROGRAM

## 2024-03-27 PROCEDURE — 84153 ASSAY OF PSA TOTAL: CPT

## 2024-03-27 RX ORDER — METOPROLOL TARTRATE 50 MG/1
50 TABLET ORAL 2 TIMES DAILY
Qty: 180 TABLET | Refills: 3 | Status: SHIPPED | OUTPATIENT
Start: 2024-03-27 | End: 2025-03-27

## 2024-03-27 NOTE — PROGRESS NOTES
Scribed for Dr. Lencho Padilla by Del Pratt. I, Dr. Lencho Padilla have personally reviewed and agreed with the information entered by the Virtual Scribe. 03/27/24.    ASSESSMENT:  Problem List Items Addressed This Visit          Genitourinary and Reproductive    Bladder neck contracture    S/P insertion of penile implant    Erectile dysfunction after radical prostatectomy       Hematology and Neoplasia    Prostate cancer (CMS/HCC) - Primary    Relevant Orders    PSA     Prostate cancer s/p RALP - next PSA ordered - last 05/2023  Bladder neck contracture - now s/p BNI (Feb 2022), remains patent  PP-CHRISTOS - now s/p AUS (activated Sept 2022)  PP-ED - now s/p IPP    PLAN:  #Bladder neck contracture  #Urinary incontinence  Doing well s/p BNI and AUS implant.   Stream remains strong, empties well; no signs of BNC recurrence.   Incontinence remains 80% better compared to before AUS placement.   Still bothersome, but not enough to warrant surgical intervention.  Continues to wear pads // adult briefs; changing 1-2x daily.    Discussed options for his ongoing incontinence including continued observation vs  revision surgery with a tighter cuff. He is not interested in surgery at this time, elects to continue observation only for now. Can revisit options next visit.     RTC in 6 months for reassessment.      #Erectile dysfunction:  S/p IPP placement with Dr. Mancilla. (02/07/24)  Doing well post-operatively, continues to heal.   Continues follow up with Dr. Mancilla.     All questions were answered to the patient’s satisfaction.  Patient agrees with the plan and wishes to proceed.  Continue follow-up for ongoing care of his chronic medical conditions.       History of Present Illness (HPI):  Lennox presents for a follow up visit.  The patient’s EMR has been reviewed.  Lives in [place], [place].  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    History of prostate cancer s/p RALP (06/2020) and adjuvant radiation.  Post-op c/b  [facility] and underwent BNI in Feb 2022.   S/p BNI, c/o persistent and severe CHRISTOS.  Initially referred by Dr. Mancilla for consideration for an AUS.   Now s/p AUS placement, activated in Sept 2022.   Leakage remains 80% improved since activation.  PSA UDT.     For his severe erectile dysfunction.   Now s/p IPP placement with Dr. Mancilla. (02/07/24)    TODAY: (03/27/24)  C/o persistent leakage.  Changing pads/adult briefs 1-2x daily.   Otherwise, urinary symptoms have been stable.   Stream remains strong, and feels he empties well.  Denies any recent infections, gross hematuria.   Reports he has been doing well overall.    Recently underwent IPP placement with Dr. Mancilla.   Continues to heal, no recent infections.   Had some pain initially post-op.   Since improved and not bothersome.    TO REVIEW: Last visit (09/27/23)  Continues to do well overall.   C/o persistent leakage and ED.  The leakage remains 80% improved since AUS activation (Sept 2022)  However, still requires precautionary pads/diaper for leakage.  This is somewhat bothersome, but he remains satisfied with the results of the procedure.      C/o persistent severe ED.  Interested in being treated for this.   He is leaning towards IPP placement.      Otherwise, denies any acute or worsening concerns.   Denies any recent UTIs, gross hematuria, fevers or chills.      TO REVIEW: (12/07/22)  Reports improvement s/p AUS, however still experiencing some mild persistent leakage, particularly at night or at work despite AUS. Reports he is 80% improved and happy with results. Wears a diaper during the day at work but never has to change this. Reports strong stream. Acknowledges persistent erectile dysfunction, interested in learning more regarding therapeutic options.      TO REVIEW:  History of Marleni 3+4=7 prostate cancer.  s/p RALP 06/2020 with Dr. Martinez.  PSA <0.10 ng/mL 11/2021.    He reports that after his prostatectomy he experienced BNC  This required multiple dilations  in office and OR, last was in June 2020  stream is ok, feels like he empties well (PVR in Nov visit is 10ml)  No regular urgency or frequency but does have CHRISTOS  Reports leakage is constant, can control this better during the day  Uses 2 pads during the day then 3-4 pull-ups at night  No hematuria, dysuria, UTIs.    Was seen by Dr. Mancilla and underwent cystoscopy.   Revealed ok coaptation of external sphincter but BNC present, unable to pass scope.  Underwent BNI in Feb 2022.   Post-op complained of persistent CHRISTOS.  Initially referred by Dr. Mancilla for AUS consideration.     Reports today he is voiding well without obstruction  Had ongoing PP-CHRISTOS, worse at night  Wearing pull-up, going through many of these a day when he is actually active and drinking water  No dysuria or hematuria, feeling well    C/o severe ED. LBANCA 0  He has failed multiple PDE5i  Gets some engorgement but never firm  Erections were good prior to surgery  Not interested in treatment until incontinence improved.     PMH/PSH/Family/Social history all reviewed and unchanged  has atrial fibrillation, no on anticoagulation  Nonsmoker  No family history of  malignancy     Past Medical History:   Diagnosis Date    Asthma     Cardiology follow-up encounter 11/06/2023    James C Ramicone,     Erectile dysfunction after radical prostatectomy     Hypertension     Lumbar radiculopathy     Paroxysmal atrial fibrillation (CMS/HCC)     Prostate cancer (CMS/HCC)     Sleep apnea     does not use his CPAP     Past Surgical History:   Procedure Laterality Date    ABLATION OF DYSRHYTHMIC FOCUS  2014    Catheter Ablation    CYSTOSCOPY  02/10/2022    PROSTATECTOMY  2021    URINARY SPHINCTER IMPLANT  07/19/2022     Family History   Problem Relation Name Age of Onset    Thyroid disease Mother      Hypertension Mother      Atrial fibrillation Father      No Known Problems Sister      Asthma Brother       Social History     Tobacco Use   Smoking Status Some Days    Types:  Cigars   Smokeless Tobacco Never     Current Outpatient Medications   Medication Sig Dispense Refill    acetaminophen (Tylenol) 325 mg tablet Take 2 tablets (650 mg) by mouth every 6 hours if needed for mild pain (1 - 3). 30 tablet 0    albuterol 2.5 mg /3 mL (0.083 %) nebulizer solution inhale contents of 1 vial ( 3 milliliters ) in nebulizer by mouth...  (REFER TO PRESCRIPTION NOTES).      amLODIPine (Norvasc) 10 mg tablet Take 1 tablet (10 mg) by mouth once daily.      chlorhexidine (Peridex) 0.12 % solution Use 15 mL in the mouth or throat 2 times a day. Use night before surgery and morning of surgery Swish and spit 473 mL 0    doxazosin (Cardura) 1 mg tablet Take 1 tablet (1 mg) by mouth once daily.      flecainide (Tambocor) 100 mg tablet Take 1 tablet (100 mg) by mouth 2 times a day. 180 tablet 3    furosemide (Lasix) 20 mg tablet Take 1 tablet (20 mg) by mouth once daily. 90 tablet 3    gabapentin (Neurontin) 300 mg capsule Take 1 capsule (300 mg) by mouth 3 times a day for 7 days. 21 capsule 0    hydrALAZINE (Apresoline) 50 mg tablet Take 1 tablet (50 mg) by mouth 3 times a day.      metoprolol tartrate (Lopressor) 50 mg tablet Take 1 tablet by mouth 2 times a day.      olmesartan (BENIcar) 40 mg tablet Take 1 tablet (40 mg) by mouth once daily.      oxyCODONE (Roxicodone) 5 mg immediate release tablet Take 1 tablet (5 mg) by mouth every 6 hours if needed for severe pain (7 - 10). 15 tablet 0    predniSONE (Deltasone) 10 mg tablet Take 4 tablets for 4 days, then 2 tablets for 4 days, 1 tablet for 4 days 28 tablet 0    Trelegy Ellipta 200-62.5-25 mcg blister with device INHALE ONE INHALATION BY MOUTH ONCE DAILY       No current facility-administered medications for this visit.     Allergies   Allergen Reactions    Shellfish Derived Anaphylaxis    Iodine Unknown     Past medical, surgical, family and social history in the chart was reviewed and is accurate including any additions to what is in this  HPI.    REVIEW OF SYSTEMS (ROS):   Constitutional: denies any unintentional weight loss or change in strength.  Integumentary: denies any rashes or pruritus.  Eyes: denies any double vision or eye pain.  Ear/Nose/Mouth/Throat: denies any nosebleeds or gum bleeds.  Cardiovascular: denies any chest pain or syncope.  Respiratory: denies hemoptysis.  Gastrointestinal: denies nausea or vomiting.  Musculoskeletal: denies muscle cramping or weakness.  Neurologic: denies convulsions or seizures.  Hematologic/Lymphatic: denies bleeding tendencies.  Endocrine: denies heat/cold intolerance.  All other systems have been reviewed and are negative unless otherwise noted in the HPI.     OBJECTIVE:  There were no vitals taken for this visit.  PHYSICAL EXAM:  Constitutional: No obvious distress.  Eyes: Non-injected conjunctiva, sclera clear, EOMI.  Ears/Nose/Mouth/Throat: No obvious drainage per ears or nose.  Cardiovascular: Extremities are warm and well perfused. No edema, cyanosis or pallor.  Respiratory: No audible wheezing/stridor; respirations do not appear labored.  Gastrointestinal: Abdomen soft, not distended.  Musculoskeletal: Normal ROM of extremities.  Skin: No obvious rashes or open sores.  Neurologic: Alert and oriented, CN 2-12 grossly intact.  Psychiatric: Answers questions appropriately with normal affect.  Hematologic/Lymphatic/Immunologic: No obvious bruises or sites of spontaneous bleeding.  Genitourinary: No CVA tenderness, bladder not palpable.     LABS & IMAGING:  Lab Results   Component Value Date    WBC 8.5 01/31/2024    HGB 13.4 (L) 01/31/2024    HCT 42.3 01/31/2024     01/31/2024    CHOL 129 08/10/2023    TRIG 66 08/10/2023    HDL 50.0 08/10/2023    ALT 14 01/31/2024    AST 13 01/31/2024     01/31/2024    K 4.0 01/31/2024     01/31/2024    CREATININE 1.22 01/31/2024    BUN 17 01/31/2024    CO2 32 01/31/2024    TSH 0.28 (L) 08/10/2023    PSA <0.1 05/11/2022    INR 0.9 01/31/2024    HGBA1C  5.8 (H) 01/31/2024     Scribed for Dr. Lencho Padilla by Del Pratt.  I, Dr. Lencho Padilla have personally reviewed and agreed with the information entered by the Virtual Scribe. 03/27/24.

## 2024-03-27 NOTE — TELEPHONE ENCOUNTER
Rx Refill Request Telephone Encounter    Name:  Lennox Domingo  :  [id number]  Medication Name: Metoprolol 50 MG        #180 Refill: 1   Take 1 tablet twice daily  Specific Pharmacy location: [facility]  Date of last appointment: 24  Date of next appointment:   Best number to reach patient:

## 2024-04-03 ENCOUNTER — APPOINTMENT (OUTPATIENT)
Dept: ENDOCRINOLOGY | Facility: CLINIC | Age: 54
End: 2024-04-03
Payer: COMMERCIAL

## 2024-05-20 ENCOUNTER — PATIENT OUTREACH (OUTPATIENT)
Dept: CARE COORDINATION | Facility: CLINIC | Age: 54
End: 2024-05-20
Payer: COMMERCIAL

## 2024-05-20 NOTE — PROGRESS NOTES
Outreach call to patient to check in 90 days after hospital discharge to support smooth transition of care.  Pt continues to engage in conversa chats.     Interface, Conversa Mary Cleaning, RN  Lennox Domingo  Program: [facility]  MRN: [id number]  Date of Birth: unspecified    This patient had a YELLOW on Mon, 20 May 2024 10:08:37 am EDT    Question(s) with flags that caused the Alert (up to last 5 values  recorded)    Question: ROSEANNE score     Date:     2024-05-20     Color:    yellow     Answers:  1-2

## 2024-05-22 ENCOUNTER — PATIENT OUTREACH (OUTPATIENT)
Dept: CARE COORDINATION | Facility: CLINIC | Age: 54
End: 2024-05-22
Payer: COMMERCIAL

## 2024-05-22 NOTE — PROGRESS NOTES
Outreach call to patient to support a smooth transition of care from recent admission. Pt is engaged in asthma  Conversa chatbot for additional support and education through transition period.  Will continue to monitor through transition period.

## 2024-06-11 ENCOUNTER — NUTRITION (OUTPATIENT)
Dept: ENDOCRINOLOGY | Facility: CLINIC | Age: 54
End: 2024-06-11
Payer: COMMERCIAL

## 2024-06-11 VITALS — WEIGHT: 315 LBS | BODY MASS INDEX: 40.43 KG/M2 | HEIGHT: 74 IN

## 2024-06-11 DIAGNOSIS — Z71.3 DIETARY COUNSELING: ICD-10-CM

## 2024-06-11 PROCEDURE — 97803 MED NUTRITION INDIV SUBSEQ: CPT | Performed by: DIETITIAN, REGISTERED

## 2024-06-11 NOTE — PROGRESS NOTES
"Follow-up Nutrition Assessment    Interval History: Patient presents for follow-up nutrition appointment for weight management/desire to lose weight. Since last nutrition visit on 2/28/24, pt with a 22 lbs weight gain which he attributes to recent hospitalization for one week due to asthma exacerbation where he was placed on increased steroids for treatment. Further explains that due to not feeling well, was not fully adhering to all healthy eating concepts or exercising but back on track now and states he is very motivated to implement healthy lifestyle behaviors to achieve goals of weight loss.  See all interventions/recommendations below as discussed during visit this day.      Nutrition Interventions/Recommendations from last visit scheduled on 2/28/24:  - Please refer to your book entitled: Your Mediterranean Meal Plan, and follow Mediterranean Diet guidelines as discussed.  - The Healthy Plate style of eating can be a helpful tool for incorporating healthy balanced meals in appropriate portions. (Healthy Plate: Start with a 9-inch diameter plate. Fill 1/2 the plate with non-starchy vegetables, 1/4 of the plate with whole grains or starchy vegetables, and 1/4 of the plate with a lean source of protein.   - Consider pre-planning all healthy meals for the week. Refer to your book for menus and recipes to get you started.  - Aim for 64 ounces of water daily and please consider elimination of juice/sugar sweetened beverages.  - Incorporate weekly exercise again per MD approval following recovery from recent surgery.  - Follow-up with nutrition in June as scheduled.      Anthropometrics:  Height:   Ht Readings from Last 1 Encounters:   06/11/24 1.88 m (6' 2\")      Weight:   Wt Readings from Last 10 Encounters:   03/27/24 148 kg (326 lb)   02/28/24 145 kg (319 lb)   02/07/24 (!) 152 kg (335 lb 1.6 oz)   02/01/24 (!) 152 kg (334 lb 8 oz)   01/31/24 (!) 151 kg (332 lb 14.3 oz)   11/06/23 150 kg (330 lb)   09/27/23 149 " kg (329 lb 8 oz)   08/11/23 (!) 151 kg (333 lb 8 oz)   06/09/23 149 kg (327 lb 7 oz)   05/22/23 148 kg (327 lb)      Current BMI:   BMI Readings from Last 1 Encounters:   06/11/24 41.86 kg/m²        Malnutrition Screening:  Significant Unintentional weight loss: No  Eating less than 75% of usual intake for more than 2 weeks: No  Potential Signs of Inflammation: No    Recommended Malnutrition Diagnosis: No malnutrition identified    Diet Recall-  Breakfast- oatmeal with fruit   Lunch- protein bar OR granola bar   Dinner- fish OR chicken OR turkey with various starches and vegetables   Snacks- none OR occasional sweet   Beverages- flavored water and eliminated soda and significantly cut back   Physical Activity- going to start to work with a  next week but hasn't been able to incorporate physical activity in awhile.    Other pertinent patient reported Information:  - Recently in the hospital for one week due to asthma exacerbation with increased steroids given  - Frustrated with how quickly his weight increased due to such    Nutrition Diagnosis: Food and nutrition-related knowledge deficit related to lack of prior exposure to information as evidenced by BMI above normative standard for age and gender.     Readiness to Learn:  Cognitive ability: Alert and oriented  Motivation to learn: Interested  Family Support: Unable to assess- family not present  Instruction provided to: Patient  Patient learns best by: Multiple methods  Factors affecting learning: None   Physical limitations affecting learning: None    Education Materials Provided:   Protein bar and drink options hand out    Nutrition Interventions/Recommendations for 6/11/2024:  - Please continue to incorporate guidelines from the Mediterranean Diet to make healthy meals as discussed.  - Instead of just having a granola bar at lunch time, please consider a protein drink or bar from the list provided and have a serving of fruit with it.  - Aim for  64 ounces of water daily and please consider elimination of juice/sugar sweetened beverages.  - Incorporate weekly exercise again per MD approval.  - Follow-up with nutrition as scheduled.      Nutrition Monitoring & Evaluation: adherence to recommendations and patient stated goals    Need for follow-up:  As scheduled in 3 months    Referred by: Brianna Corral Billing Type: Medical Nutrition Re-Assessment, each 15 min increment, for 3 increments.    SIGNATURE:   Renae Morgan RD, LD, [facility]                                                             DATE:   6/11/2024

## 2024-06-11 NOTE — PATIENT INSTRUCTIONS
- Please continue to incorporate guidelines from the Mediterranean Diet to make healthy meals as discussed.  - Instead of just having a granola bar at lunch time, please consider a protein drink or bar from the list provided and have a serving of fruit with it.  - Aim for 64 ounces of water daily and please consider elimination of juice/sugar sweetened beverages.  - Incorporate weekly exercise again per MD approval.  - Follow-up with nutrition as scheduled.

## 2024-06-17 ENCOUNTER — APPOINTMENT (OUTPATIENT)
Dept: ENDOCRINOLOGY | Facility: CLINIC | Age: 54
End: 2024-06-17
Payer: COMMERCIAL

## 2024-06-26 DIAGNOSIS — I10 PRIMARY HYPERTENSION: Primary | ICD-10-CM

## 2024-06-27 ENCOUNTER — APPOINTMENT (OUTPATIENT)
Dept: UROLOGY | Facility: HOSPITAL | Age: 54
End: 2024-06-27
Payer: COMMERCIAL

## 2024-06-27 DIAGNOSIS — Z96.0 S/P INSERTION OF PENILE IMPLANT: Primary | ICD-10-CM

## 2024-06-27 PROCEDURE — 99213 OFFICE O/P EST LOW 20 MIN: CPT | Performed by: UROLOGY

## 2024-06-27 PROCEDURE — 3008F BODY MASS INDEX DOCD: CPT | Performed by: UROLOGY

## 2024-06-27 RX ORDER — OLMESARTAN MEDOXOMIL 40 MG/1
40 TABLET ORAL DAILY
Qty: 90 TABLET | Refills: 3 | Status: SHIPPED | OUTPATIENT
Start: 2024-06-27 | End: 2025-06-27

## 2024-06-27 NOTE — PROGRESS NOTES
POV    Last seen - 2/22/24     HISTORY OF PRESENT ILLNESS:   Lennox Domingo is a 53 y.o. male who is being seen today for POV    h/o Pca s/p RALP with Dr. Martinez.  PSA UDT     Pt with h/o bladder neck contracture s/p BNI Feb 2022.  Had AUS in sept 2022 with Dr. Padilla.       Pt with severe ED. Failed PDE5i, not able to do ICI    Pt had   CX 21cm +1cm RTE, 100 mL conceal reservoir left submuscular through counter incision on 2/7/24.  Discharged home from pacu.      2/22/24 - Been doing ok.  No f/c. Urinating ok.  Pain managable    3/21/24 - Having issues inflating.  No f/c.  Urinating ok    6/27/24 - Still having issues inflating    PAST MEDICAL HISTORY:  Past Medical History:   Diagnosis Date    Asthma ([facility])     Cardiology follow-up encounter 11/06/2023    James C Ramicone,     Erectile dysfunction after radical prostatectomy     Hypertension     Lumbar radiculopathy     Paroxysmal atrial fibrillation (Multi)     Prostate cancer (Multi)     Sleep apnea     does not use his CPAP       PAST SURGICAL HISTORY:  Past Surgical History:   Procedure Laterality Date    ABLATION OF DYSRHYTHMIC FOCUS  2014    Catheter Ablation    CYSTOSCOPY  02/10/2022    PROSTATECTOMY  2021    URINARY SPHINCTER IMPLANT  07/19/2022        ALLERGIES:   Allergies   Allergen Reactions    Shellfish Derived Anaphylaxis    Iodine Unknown        MEDICATIONS:   Current Outpatient Medications   Medication Instructions    acetaminophen (TYLENOL) 650 mg, oral, Every 6 hours PRN    albuterol 2.5 mg /3 mL (0.083 %) nebulizer solution inhale contents of 1 vial ( 3 milliliters ) in nebulizer by mouth...  (REFER TO PRESCRIPTION NOTES).    amLODIPine (NORVASC) 10 mg, oral, Daily    chlorhexidine (Peridex) 0.12 % solution 15 mL, Mouth/Throat, 2 times daily, Use night before surgery and morning of surgery Swish and spit    doxazosin (CARDURA) 1 mg, oral, Daily    flecainide (TAMBOCOR) 100 mg, oral, 2 times daily    furosemide (LASIX) 20 mg, oral,  Daily    gabapentin (NEURONTIN) 300 mg, oral, 3 times daily    hydrALAZINE (APRESOLINE) 50 mg, oral, 3 times daily    metoprolol tartrate (LOPRESSOR) 50 mg, oral, 2 times daily    olmesartan (BENICAR) 40 mg, oral, Daily    oxyCODONE (ROXICODONE) 5 mg, oral, Every 6 hours PRN    predniSONE (Deltasone) 10 mg tablet Take 4 tablets for 4 days, then 2 tablets for 4 days, 1 tablet for 4 days    Trelegy Ellipta 200-62.5-25 mcg blister with device INHALE ONE INHALATION BY MOUTH ONCE DAILY        PHYSICAL EXAM:  There were no vitals taken for this visit.  Constitutional: Patient appears well-developed and well-nourished. No distress.    Pulmonary/Chest: Effort normal. No respiratory distress.   Abdominal: Soft, ND NT  : Tips symmetrical and mid glans, pump midline and dependent, incisions healing well.  Musculoskeletal: Normal range of motion.    Neurological: Alert and oriented to person, place, and time.  Psychiatric: Normal mood and affect. Behavior is normal. Thought content normal.      Labs  Lab Results   Component Value Date    PSA <0.10 03/27/2024     Lab Results   Component Value Date    GFRMALE 74 08/10/2023     Lab Results   Component Value Date    CREATININE 1.22 01/31/2024     Lab Results   Component Value Date    CHOL 129 08/10/2023     Lab Results   Component Value Date    HDL 50.0 08/10/2023     Lab Results   Component Value Date    CHHDL 2.6 08/10/2023     Lab Results   Component Value Date    LDLF 66 08/10/2023     Lab Results   Component Value Date    VLDL 13 08/10/2023     Lab Results   Component Value Date    TRIG 66 08/10/2023     Lab Results   Component Value Date    HGBA1C 5.8 (H) 01/31/2024     Lab Results   Component Value Date    HCT 42.3 01/31/2024     Assessment:      1. S/P insertion of penile implant          Lennox BHANU Domingo is a 53 y.o. male here for FUV     Plan:   Cycle daily, instructed on proper use  No restrictions    FU in 6m, sooner if needed

## 2024-07-08 DIAGNOSIS — I10 PRIMARY HYPERTENSION: Primary | ICD-10-CM

## 2024-07-08 PROBLEM — E66.01 SEVERE OBESITY (MULTI): Status: ACTIVE | Noted: 2024-07-08

## 2024-07-08 PROBLEM — G89.18 ACUTE POSTOPERATIVE PAIN: Status: ACTIVE | Noted: 2024-07-08

## 2024-07-08 PROBLEM — N52.31 ERECTILE DYSFUNCTION AFTER RADICAL PROSTATECTOMY: Status: RESOLVED | Noted: 2023-11-09 | Resolved: 2024-07-08

## 2024-07-08 PROBLEM — G47.33 OBSTRUCTIVE SLEEP APNEA SYNDROME: Status: ACTIVE | Noted: 2023-08-11

## 2024-07-08 PROBLEM — N40.0 BENIGN PROSTATIC HYPERPLASIA: Status: ACTIVE | Noted: 2024-07-08

## 2024-07-08 PROBLEM — B34.9 VIRAL INFECTION: Status: ACTIVE | Noted: 2024-07-08

## 2024-07-08 PROBLEM — R60.0 EDEMA OF LOWER EXTREMITY: Status: ACTIVE | Noted: 2024-07-08

## 2024-07-08 PROBLEM — H10.10 ALLERGIC CONJUNCTIVITIS: Status: ACTIVE | Noted: 2024-07-08

## 2024-07-08 RX ORDER — AMLODIPINE BESYLATE 10 MG/1
10 TABLET ORAL DAILY
Qty: 90 TABLET | Refills: 1 | Status: SHIPPED | OUTPATIENT
Start: 2024-07-08

## 2024-07-10 ENCOUNTER — TELEPHONE (OUTPATIENT)
Dept: CARDIOLOGY | Facility: CLINIC | Age: 54
End: 2024-07-10
Payer: COMMERCIAL

## 2024-07-10 NOTE — TELEPHONE ENCOUNTER
Pt called stating his BLE from knee down are more swollen x1 week. Reports SOB with exertion. Does not monitor BP/HR at home. Pt's last OV 11/6/23 and his next OV is 7/24/24. Pt currently taking Lasix 20mg daily. Secure chat sent to Dr. Ramicone. Per Dr. Ramicone: Increase Lasix to 40mg daily for now. Spoke to pt and made aware. Advised to elevate legs and watch salt intake. Pt to call office with update in a week.

## 2024-07-19 ENCOUNTER — PATIENT OUTREACH (OUTPATIENT)
Dept: CARE COORDINATION | Facility: CLINIC | Age: 54
End: 2024-07-19
Payer: COMMERCIAL

## 2024-07-23 PROBLEM — I10 HYPERTENSION: Chronic | Status: ACTIVE | Noted: 2023-11-03

## 2024-07-23 PROBLEM — I48.0 PAROXYSMAL ATRIAL FIBRILLATION WITH RAPID VENTRICULAR RESPONSE (MULTI): Chronic | Status: ACTIVE | Noted: 2023-11-03

## 2024-07-24 ENCOUNTER — APPOINTMENT (OUTPATIENT)
Dept: CARDIOLOGY | Facility: CLINIC | Age: 54
End: 2024-07-24
Payer: COMMERCIAL

## 2024-07-24 VITALS
HEIGHT: 74 IN | SYSTOLIC BLOOD PRESSURE: 144 MMHG | DIASTOLIC BLOOD PRESSURE: 90 MMHG | HEART RATE: 73 BPM | OXYGEN SATURATION: 94 % | WEIGHT: 315 LBS | BODY MASS INDEX: 40.43 KG/M2

## 2024-07-24 DIAGNOSIS — I10 PRIMARY HYPERTENSION: ICD-10-CM

## 2024-07-24 DIAGNOSIS — I48.0 PAROXYSMAL ATRIAL FIBRILLATION WITH RAPID VENTRICULAR RESPONSE (MULTI): Primary | ICD-10-CM

## 2024-07-24 PROCEDURE — 3077F SYST BP >= 140 MM HG: CPT | Performed by: INTERNAL MEDICINE

## 2024-07-24 PROCEDURE — 3080F DIAST BP >= 90 MM HG: CPT | Performed by: INTERNAL MEDICINE

## 2024-07-24 PROCEDURE — 99214 OFFICE O/P EST MOD 30 MIN: CPT | Performed by: INTERNAL MEDICINE

## 2024-07-24 PROCEDURE — 3008F BODY MASS INDEX DOCD: CPT | Performed by: INTERNAL MEDICINE

## 2024-07-24 RX ORDER — MONTELUKAST SODIUM 10 MG/1
10 TABLET ORAL DAILY
COMMUNITY

## 2024-07-24 RX ORDER — TORSEMIDE 20 MG/1
20 TABLET ORAL DAILY
Qty: 30 TABLET | Refills: 11 | Status: SHIPPED | OUTPATIENT
Start: 2024-07-24 | End: 2025-07-24

## 2024-07-24 NOTE — PROGRESS NOTES
"History Of Present Illness:      This is a 53-year-old male with history of hypertension and paroxysmal atrial fibrillation.  He is taking flecainide for maintenance of sinus rhythm, and has had no symptomatic recurrences of atrial fibrillation.  However, over the last 3 weeks he had an increase in lower extremity edema.  He increase the furosemide to 40 mg daily which has improved but he still has a significant amount of edema in his legs.  No chest pain or shortness of breath.    Review of Systems  Other review of systems negative     Last Recorded Vitals:      2/7/2024     3:15 PM 2/7/2024     3:29 PM 2/7/2024     3:30 PM 2/28/2024     2:44 PM 3/27/2024     9:48 AM 6/11/2024     2:38 PM 7/24/2024    10:18 AM   Vitals   Systolic 148 120 118  160  144   Diastolic 87 85 86  90  90   Heart Rate 62 72 70  84  73   Temp  36.5 °C (97.7 °F) 36.5 °C (97.7 °F)  36.7 °C (98.1 °F)     Resp 12 14 15       Height (in)    1.88 m (6' 2\")  1.88 m (6' 2\") 1.88 m (6' 2\")   Weight (lb)    319 326 341.1 345   BMI    40.96 kg/m2 41.86 kg/m2 43.79 kg/m2 44.3 kg/m2   BSA (m2)    2.75 m2 2.78 m2 2.84 m2 2.85 m2   Visit Report     Report  Report          Allergies:  Shellfish derived and Iodine    Outpatient Medications:  Current Outpatient Medications   Medication Instructions    acetaminophen (TYLENOL) 650 mg, oral, Every 6 hours PRN    albuterol 2.5 mg /3 mL (0.083 %) nebulizer solution inhale contents of 1 vial ( 3 milliliters ) in nebulizer by mouth...  (REFER TO PRESCRIPTION NOTES).    amLODIPine (NORVASC) 10 mg, oral, Daily    chlorhexidine (Peridex) 0.12 % solution 15 mL, Mouth/Throat, 2 times daily, Use night before surgery and morning of surgery Swish and spit    doxazosin (CARDURA) 1 mg, oral, Daily    flecainide (TAMBOCOR) 100 mg, oral, 2 times daily    gabapentin (NEURONTIN) 300 mg, oral, 3 times daily    hydrALAZINE (APRESOLINE) 50 mg, oral, 3 times daily    metoprolol tartrate (LOPRESSOR) 50 mg, oral, 2 times daily    " montelukast (SINGULAIR) 10 mg, oral, Daily    olmesartan (BENICAR) 40 mg, oral, Daily    oxyCODONE (ROXICODONE) 5 mg, oral, Every 6 hours PRN    predniSONE (Deltasone) 10 mg tablet Take 4 tablets for 4 days, then 2 tablets for 4 days, 1 tablet for 4 days    torsemide (DEMADEX) 20 mg, oral, Daily    Trelegy Ellipta 200-62.5-25 mcg blister with device INHALE ONE INHALATION BY MOUTH ONCE DAILY       Physical Exam:    General Appearance:  Alert, oriented, no distress  Skin:  Warm and dry  Head and Neck:  No elevation of JVP, no carotid bruits  Cardiac Exam:  Rhythm is regular, S1 and S2 are normal, no murmur S3 or S4  Lungs:  Clear to auscultation  Extremities:  1-2+ edema  Neurologic:  No focal deficits  Psychiatric:  Appropriate mood and behavior    Cardiology Tests:  I have personally review the diagnostic cardiac testing and my interpretation is as follows:    Echocardiogram May 2023: Normal left ventricular function  EKG January 31, 2024: Normal sinus rhythm    Assessment/Plan   Problem List Items Addressed This Visit             ICD-10-CM    Paroxysmal atrial fibrillation with rapid ventricular response (Multi) - Primary (Chronic) I48.0     1.  Paroxysmal atrial fibrillation: Lennox is maintaining sinus rhythm on the current dose of flecainide.  We will continue the antiarrhythmic drug therapy indefinitely.  He has a remote history of pulmonary vein isolation and has a very low AF burden overall.  EP follow-up in 6 to 8 months.    2.  Lower extremity edema: The patient will be changed from furosemide to torsemide 20 mg daily.  An echocardiogram will be obtained to reassess the left ventricular function.  The most recent echocardiogram from May 2023 showed a normal ejection fraction.    3.  Hypertension: Overall blood pressure readings have been reasonable.  Continue same blood pressure medication regimen.         Relevant Medications    torsemide (Demadex) 20 mg tablet    Other Relevant Orders    Transthoracic  Echo (TTE) Complete    Hypertension (Chronic) I10         James C Ramicone, DO

## 2024-07-24 NOTE — ASSESSMENT & PLAN NOTE
1.  Paroxysmal atrial fibrillation: Lennox is maintaining sinus rhythm on the current dose of flecainide.  We will continue the antiarrhythmic drug therapy indefinitely.  He has a remote history of pulmonary vein isolation and has a very low AF burden overall.  EP follow-up in 6 to 8 months.    2.  Lower extremity edema: The patient will be changed from furosemide to torsemide 20 mg daily.  An echocardiogram will be obtained to reassess the left ventricular function.  The most recent echocardiogram from May 2023 showed a normal ejection fraction.    3.  Hypertension: Overall blood pressure readings have been reasonable.  Continue same blood pressure medication regimen.

## 2024-08-21 ENCOUNTER — HOSPITAL ENCOUNTER (OUTPATIENT)
Dept: CARDIOLOGY | Facility: CLINIC | Age: 54
Discharge: HOME | End: 2024-08-21
Payer: COMMERCIAL

## 2024-08-21 DIAGNOSIS — I48.0 PAROXYSMAL ATRIAL FIBRILLATION WITH RAPID VENTRICULAR RESPONSE (MULTI): ICD-10-CM

## 2024-08-21 PROCEDURE — 93306 TTE W/DOPPLER COMPLETE: CPT | Performed by: INTERNAL MEDICINE

## 2024-08-21 PROCEDURE — 93306 TTE W/DOPPLER COMPLETE: CPT

## 2024-08-21 PROCEDURE — 2500000004 HC RX 250 GENERAL PHARMACY W/ HCPCS (ALT 636 FOR OP/ED): Performed by: INTERNAL MEDICINE

## 2024-08-22 LAB
AORTIC VALVE MEAN GRADIENT: 4.1 MMHG
AORTIC VALVE PEAK VELOCITY: 1.43 M/S
AV PEAK GRADIENT: 8.2 MMHG
AVA (PEAK VEL): 3.17 CM2
AVA (VTI): 2.92 CM2
EJECTION FRACTION APICAL 4 CHAMBER: 53.9
EJECTION FRACTION: 57 %
LEFT ATRIUM VOLUME AREA LENGTH INDEX BSA: 42.6 ML/M2
LEFT VENTRICLE INTERNAL DIMENSION DIASTOLE: 6.05 CM (ref 3.5–6)
LEFT VENTRICULAR OUTFLOW TRACT DIAMETER: 2.3 CM
RIGHT VENTRICLE FREE WALL PEAK S': 0.13 CM/S
TRICUSPID ANNULAR PLANE SYSTOLIC EXCURSION: 2.9 CM

## 2024-09-04 ENCOUNTER — TELEPHONE (OUTPATIENT)
Dept: CARDIOLOGY | Facility: CLINIC | Age: 54
End: 2024-09-04
Payer: COMMERCIAL

## 2024-09-04 DIAGNOSIS — R60.0 EDEMA OF LOWER EXTREMITY: ICD-10-CM

## 2024-09-04 DIAGNOSIS — I48.0 PAROXYSMAL ATRIAL FIBRILLATION WITH RAPID VENTRICULAR RESPONSE (MULTI): ICD-10-CM

## 2024-09-04 NOTE — TELEPHONE ENCOUNTER
Pt called on 9/3. He reports that swelling is still present. At last OV, Dr Ramicone stopped lasix and ordered torsemide 20mg once daily. Pt denies that he is in afib. Mild sob. Pt states that he has gained 6 lbs over the last few weeks. Urinating more with torsemide.     Reviewed with Dr Ramicone, increase torsemide to 40mg once daily, get BMP and BNP in 1 week.     Called pt and left a detailed VM. Labs ordered. Pended to Dr Ramicone.

## 2024-09-05 ENCOUNTER — PATIENT OUTREACH (OUTPATIENT)
Dept: CARE COORDINATION | Facility: CLINIC | Age: 54
End: 2024-09-05
Payer: COMMERCIAL

## 2024-09-05 DIAGNOSIS — R60.0 EDEMA OF LOWER EXTREMITY: ICD-10-CM

## 2024-09-05 RX ORDER — BUMETANIDE 2 MG/1
2 TABLET ORAL DAILY
Qty: 30 TABLET | Refills: 11 | Status: SHIPPED | OUTPATIENT
Start: 2024-09-05 | End: 2025-09-05

## 2024-09-11 ENCOUNTER — APPOINTMENT (OUTPATIENT)
Dept: ENDOCRINOLOGY | Facility: CLINIC | Age: 54
End: 2024-09-11
Payer: COMMERCIAL

## 2024-10-02 ENCOUNTER — APPOINTMENT (OUTPATIENT)
Dept: UROLOGY | Facility: CLINIC | Age: 54
End: 2024-10-02
Payer: COMMERCIAL

## 2024-10-09 ENCOUNTER — APPOINTMENT (OUTPATIENT)
Dept: UROLOGY | Facility: CLINIC | Age: 54
End: 2024-10-09
Payer: COMMERCIAL

## 2024-10-09 VITALS
WEIGHT: 315 LBS | BODY MASS INDEX: 44.81 KG/M2 | HEART RATE: 83 BPM | DIASTOLIC BLOOD PRESSURE: 75 MMHG | SYSTOLIC BLOOD PRESSURE: 160 MMHG | TEMPERATURE: 98.7 F

## 2024-10-09 DIAGNOSIS — C61 PROSTATE CANCER (MULTI): Primary | ICD-10-CM

## 2024-10-09 DIAGNOSIS — R32 URINARY INCONTINENCE, UNSPECIFIED TYPE: ICD-10-CM

## 2024-10-09 DIAGNOSIS — N52.31 ERECTILE DYSFUNCTION AFTER RADICAL PROSTATECTOMY: ICD-10-CM

## 2024-10-09 DIAGNOSIS — N32.0 BLADDER NECK CONTRACTURE: ICD-10-CM

## 2024-10-09 LAB
POC APPEARANCE, URINE: CLEAR
POC BILIRUBIN, URINE: NEGATIVE
POC BLOOD, URINE: NEGATIVE
POC COLOR, URINE: YELLOW
POC GLUCOSE, URINE: NEGATIVE MG/DL
POC KETONES, URINE: NEGATIVE MG/DL
POC LEUKOCYTES, URINE: NEGATIVE
POC NITRITE,URINE: NEGATIVE
POC PH, URINE: 6.5 PH
POC PROTEIN, URINE: NEGATIVE MG/DL
POC SPECIFIC GRAVITY, URINE: 1.02
POC UROBILINOGEN, URINE: 0.2 EU/DL

## 2024-10-09 PROCEDURE — 99214 OFFICE O/P EST MOD 30 MIN: CPT | Performed by: STUDENT IN AN ORGANIZED HEALTH CARE EDUCATION/TRAINING PROGRAM

## 2024-10-09 PROCEDURE — 3078F DIAST BP <80 MM HG: CPT | Performed by: STUDENT IN AN ORGANIZED HEALTH CARE EDUCATION/TRAINING PROGRAM

## 2024-10-09 PROCEDURE — G2211 COMPLEX E/M VISIT ADD ON: HCPCS | Performed by: STUDENT IN AN ORGANIZED HEALTH CARE EDUCATION/TRAINING PROGRAM

## 2024-10-09 PROCEDURE — 3077F SYST BP >= 140 MM HG: CPT | Performed by: STUDENT IN AN ORGANIZED HEALTH CARE EDUCATION/TRAINING PROGRAM

## 2024-10-09 PROCEDURE — 81003 URINALYSIS AUTO W/O SCOPE: CPT | Performed by: STUDENT IN AN ORGANIZED HEALTH CARE EDUCATION/TRAINING PROGRAM

## 2024-10-09 RX ORDER — ALBUTEROL SULFATE 90 UG/1
2 INHALANT RESPIRATORY (INHALATION)
COMMUNITY
Start: 2024-08-06

## 2024-10-09 RX ORDER — DOXAZOSIN 2 MG/1
2 TABLET ORAL DAILY
COMMUNITY
Start: 2023-11-07

## 2024-10-09 NOTE — PROGRESS NOTES
Scribed for Dr. Lencho Padilla by Del Pratt. I, Dr. Lencho Padilla have personally reviewed and agreed with the information entered by the Virtual Scribe. 10/09/24.    ASSESSMENT:  Problem List Items Addressed This Visit       Prostate cancer (Multi) - Primary    Relevant Orders    PSA    Bladder neck contracture     Other Visit Diagnoses       Urinary incontinence, unspecified type        Relevant Orders    POCT UA Automated manually resulted (Completed)    Post-Void Residual (Completed)    Erectile dysfunction after radical prostatectomy                Prostate cancer s/p RALP - next PSA ordered - last 05/2023  Bladder neck contracture - now s/p BNI (Feb 2022), remains patent  PP-CHRISTOS - now s/p AUS (activated Sept 2022)  PP-ED - now s/p IPP    PLAN:  #Bladder neck contracture  #Urinary incontinence  Doing well s/p BNI and AUS implant.   Stream remains strong, empties well; no signs of BNC recurrence.   Incontinence remains 80% better compared to before AUS placement.   Still bothersome, but not enough to warrant surgical intervention.  Wears liners during the day (1-2x daily);   Wears pads // adult briefs at night.    Discussed options for his ongoing incontinence including continued observation vs  revision surgery with a tighter cuff. He is not interested in surgery at this time, elects to continue observation only for now. May consider down-sizing cuff in the future, can revisit options next visit.      #Prostate cancer  Repeat a PSA in April 2025 for prostate cancer surveillance.   Schedule follow up to review results.     #Erectile dysfunction:  S/p IPP placement with Dr. Mancilla. (02/07/24)  Doing well post-operatively, continues to heal.   Continues follow up with Dr. Mancilla.     All questions were answered to the patient’s satisfaction.  Patient agrees with the plan and wishes to proceed.  Continue follow-up for ongoing care of his chronic medical conditions.       History of Present Illness (HPI):  Lennox  presents for a follow up visit.  The patient’s EMR has been reviewed.  Lives in [place], [place].  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    History of prostate cancer s/p RALP (06/2020) and adjuvant radiation.  Post-op c/b BNC, s/p BNI in Feb 2022.   Following BNI, c/o persistent and severe CHRISTOS.  Initially referred by Dr. Mancilla for AUS consideration.  Now s/p AUS placement, activated in Sept 2022.   Leakage remains 80% improved since activation.  PSA UDT.     For his severe erectile dysfunction.   Now s/p IPP placement with Dr. Mancilla. (02/07/24)    TODAY: (10/09/24)  Presents for follow up visit.   Reports he has been doing well overall.   Leakage persists, but considered stable.   Wears liners during the day (1-2x daily);   Wears pads // adult briefs at night.  No recent infections, gross hematuria, fevers or chills.     TO REVIEW: Last visit (03/27/24)  C/o persistent leakage.  Changing pads/adult briefs 1-2x daily.   Otherwise, urinary symptoms have been stable.   Stream remains strong, and feels he empties well.  Denies any recent infections, gross hematuria.   Reports he has been doing well overall.    Recently underwent IPP placement with Dr. Mancilla.   Continues to heal, no recent infections.   Had some pain initially post-op.   Since improved and not bothersome.    TO REVIEW: (09/27/23)  Continues to do well overall.   C/o persistent leakage and ED.  The leakage remains 80% improved since AUS activation (Sept 2022)  However, still requires precautionary pads/diaper for leakage.  This is somewhat bothersome, but he remains satisfied with the results of the procedure.      C/o persistent severe ED.  Interested in being treated for this.   He is leaning towards IPP placement.      Otherwise, denies any acute or worsening concerns.   Denies any recent UTIs, gross hematuria, fevers or chills.      TO REVIEW: (12/07/22)  Reports improvement s/p AUS, however still experiencing some mild persistent  leakage, particularly at night or at work despite AUS. Reports he is 80% improved and happy with results. Wears a diaper during the day at work but never has to change this. Reports strong stream. Acknowledges persistent erectile dysfunction, interested in learning more regarding therapeutic options.      TO REVIEW:  History of [place] 3+4=7 prostate cancer.  s/p RALP 06/2020 with Dr. Martinez.  PSA <0.10 ng/mL 11/2021.    He reports that after his prostatectomy he experienced BNC  This required multiple dilations in office and OR, last was in June 2020  stream is ok, feels like he empties well (PVR in Nov visit is 10ml)  No regular urgency or frequency but does have CHRISTOS  Reports leakage is constant, can control this better during the day  Uses 2 pads during the day then 3-4 pull-ups at night  No hematuria, dysuria, UTIs.    Was seen by Dr. Mancilla and underwent cystoscopy.   Revealed ok coaptation of external sphincter but BNC present, unable to pass scope.  Underwent BNI in Feb 2022.   Post-op complained of persistent CHRISTOS.  Initially referred by Dr. Mancilla for AUS consideration.     Reports today he is voiding well without obstruction  Had ongoing PP-CHRISTOS, worse at night  Wearing pull-up, going through many of these a day when he is actually active and drinking water  No dysuria or hematuria, feeling well    C/o severe ED. BLANCA 0  He has failed multiple PDE5i  Gets some engorgement but never firm  Erections were good prior to surgery  Not interested in treatment until incontinence improved.     PMH/PSH/Family/Social history all reviewed and unchanged  has atrial fibrillation, no on anticoagulation  Nonsmoker  No family history of  malignancy     Past Medical History:   Diagnosis Date    Asthma ([facility])     Cardiology follow-up encounter 11/06/2023    James C Ramicone, DO    Erectile dysfunction after radical prostatectomy     Hypertension     Lumbar radiculopathy     Paroxysmal atrial fibrillation (Multi)     Paroxysmal  atrial fibrillation with rapid ventricular response (Multi) 11/03/2023    Prostate cancer (Multi)     Sleep apnea     does not use his CPAP     Past Surgical History:   Procedure Laterality Date    ABLATION OF DYSRHYTHMIC FOCUS  2014    Catheter Ablation    CYSTOSCOPY  02/10/2022    PROSTATECTOMY  2021    URINARY SPHINCTER IMPLANT  07/19/2022     Family History   Problem Relation Name Age of Onset    Thyroid disease Mother      Hypertension Mother      Atrial fibrillation Father      No Known Problems Sister      Asthma Brother       Social History     Tobacco Use   Smoking Status Some Days    Types: Cigars   Smokeless Tobacco Never     Current Outpatient Medications   Medication Sig Dispense Refill    acetaminophen (Tylenol) 325 mg tablet Take 2 tablets (650 mg) by mouth every 6 hours if needed for mild pain (1 - 3). 30 tablet 0    albuterol 2.5 mg /3 mL (0.083 %) nebulizer solution inhale contents of 1 vial ( 3 milliliters ) in nebulizer by mouth...  (REFER TO PRESCRIPTION NOTES).      amLODIPine (Norvasc) 10 mg tablet Take 1 tablet (10 mg) by mouth once daily. 90 tablet 1    bumetanide (Bumex) 2 mg tablet Take 1 tablet (2 mg) by mouth once daily. 30 tablet 11    chlorhexidine (Peridex) 0.12 % solution Use 15 mL in the mouth or throat 2 times a day. Use night before surgery and morning of surgery Swish and spit 473 mL 0    doxazosin (Cardura) 1 mg tablet Take 1 tablet (1 mg) by mouth once daily.      flecainide (Tambocor) 100 mg tablet Take 1 tablet (100 mg) by mouth 2 times a day. 180 tablet 3    gabapentin (Neurontin) 300 mg capsule Take 1 capsule (300 mg) by mouth 3 times a day for 7 days. 21 capsule 0    hydrALAZINE (Apresoline) 50 mg tablet Take 1 tablet (50 mg) by mouth 3 times a day.      metoprolol tartrate (Lopressor) 50 mg tablet Take 1 tablet by mouth 2 times a day. 180 tablet 3    montelukast (Singulair) 10 mg tablet Take 1 tablet (10 mg) by mouth once daily.      olmesartan (BENIcar) 40 mg tablet Take  1 tablet (40 mg) by mouth once daily. 90 tablet 3    oxyCODONE (Roxicodone) 5 mg immediate release tablet Take 1 tablet (5 mg) by mouth every 6 hours if needed for severe pain (7 - 10). 15 tablet 0    predniSONE (Deltasone) 10 mg tablet Take 4 tablets for 4 days, then 2 tablets for 4 days, 1 tablet for 4 days 28 tablet 0    Trelegy Ellipta 200-62.5-25 mcg blister with device INHALE ONE INHALATION BY MOUTH ONCE DAILY       No current facility-administered medications for this visit.     Allergies   Allergen Reactions    Shellfish Derived Anaphylaxis    Iodine Unknown     Past medical, surgical, family and social history in the chart was reviewed and is accurate including any additions to what is in this HPI.    REVIEW OF SYSTEMS (ROS):   Constitutional: denies any unintentional weight loss or change in strength.  Integumentary: denies any rashes or pruritus.  Eyes: denies any double vision or eye pain.  Ear/Nose/Mouth/Throat: denies any nosebleeds or gum bleeds.  Cardiovascular: denies any chest pain or syncope.  Respiratory: denies hemoptysis.  Gastrointestinal: denies nausea or vomiting.  Musculoskeletal: denies muscle cramping or weakness.  Neurologic: denies convulsions or seizures.  Hematologic/Lymphatic: denies bleeding tendencies.  Endocrine: denies heat/cold intolerance.  All other systems have been reviewed and are negative unless otherwise noted in the HPI.     OBJECTIVE:  There were no vitals taken for this visit.  PHYSICAL EXAM:  Constitutional: No obvious distress.  Eyes: Non-injected conjunctiva, sclera clear, EOMI.  Ears/Nose/Mouth/Throat: No obvious drainage per ears or nose.  Cardiovascular: Extremities are warm and well perfused. No edema, cyanosis or pallor.  Respiratory: No audible wheezing/stridor; respirations do not appear labored.  Gastrointestinal: Abdomen soft, not distended.  Musculoskeletal: Normal ROM of extremities.  Skin: No obvious rashes or open sores.  Neurologic: Alert and oriented,  CN 2-12 grossly intact.  Psychiatric: Answers questions appropriately with normal affect.  Hematologic/Lymphatic/Immunologic: No obvious bruises or sites of spontaneous bleeding.  Genitourinary: No CVA tenderness, bladder not palpable.     LABS & IMAGING:  Lab Results   Component Value Date    WBC 8.5 01/31/2024    HGB 13.4 (L) 01/31/2024    HCT 42.3 01/31/2024     01/31/2024    CHOL 129 08/10/2023    TRIG 66 08/10/2023    HDL 50.0 08/10/2023    ALT 14 01/31/2024    AST 13 01/31/2024     01/31/2024    K 4.0 01/31/2024     01/31/2024    CREATININE 1.22 01/31/2024    BUN 17 01/31/2024    CO2 32 01/31/2024    TSH 0.28 (L) 08/10/2023    PSA <0.10 03/27/2024    INR 0.9 01/31/2024    HGBA1C 5.8 (H) 01/31/2024     Scribed for Dr. Lencho Padilla by Del Pratt.  I, Dr. Lencho Padilla have personally reviewed and agreed with the information entered by the Virtual Scribe. 10/09/24.

## 2024-10-29 ENCOUNTER — LAB (OUTPATIENT)
Dept: LAB | Facility: LAB | Age: 54
End: 2024-10-29
Payer: COMMERCIAL

## 2024-10-29 ENCOUNTER — APPOINTMENT (OUTPATIENT)
Dept: PRIMARY CARE | Facility: CLINIC | Age: 54
End: 2024-10-29
Payer: COMMERCIAL

## 2024-10-29 VITALS
BODY MASS INDEX: 40.43 KG/M2 | HEART RATE: 61 BPM | OXYGEN SATURATION: 92 % | DIASTOLIC BLOOD PRESSURE: 84 MMHG | WEIGHT: 315 LBS | HEIGHT: 74 IN | SYSTOLIC BLOOD PRESSURE: 135 MMHG

## 2024-10-29 DIAGNOSIS — J45.20 MILD INTERMITTENT ASTHMA WITHOUT COMPLICATION (HHS-HCC): ICD-10-CM

## 2024-10-29 DIAGNOSIS — I10 PRIMARY HYPERTENSION: Primary | Chronic | ICD-10-CM

## 2024-10-29 DIAGNOSIS — C61 PROSTATE CANCER (MULTI): ICD-10-CM

## 2024-10-29 DIAGNOSIS — I10 PRIMARY HYPERTENSION: Chronic | ICD-10-CM

## 2024-10-29 DIAGNOSIS — R60.0 BILATERAL EDEMA OF LOWER EXTREMITY: ICD-10-CM

## 2024-10-29 DIAGNOSIS — Z12.11 SCREENING FOR COLON CANCER: ICD-10-CM

## 2024-10-29 DIAGNOSIS — Z23 NEED FOR INFLUENZA VACCINATION: ICD-10-CM

## 2024-10-29 DIAGNOSIS — R60.0 EDEMA OF LOWER EXTREMITY: ICD-10-CM

## 2024-10-29 PROBLEM — G89.18 ACUTE POSTOPERATIVE PAIN: Status: RESOLVED | Noted: 2024-07-08 | Resolved: 2024-10-29

## 2024-10-29 PROBLEM — B34.9 VIRAL INFECTION: Status: RESOLVED | Noted: 2024-07-08 | Resolved: 2024-10-29

## 2024-10-29 LAB
ALBUMIN SERPL BCP-MCNC: 4.5 G/DL (ref 3.4–5)
ALP SERPL-CCNC: 55 U/L (ref 33–120)
ALT SERPL W P-5'-P-CCNC: 16 U/L (ref 10–52)
ANION GAP SERPL CALC-SCNC: 14 MMOL/L (ref 10–20)
AST SERPL W P-5'-P-CCNC: 15 U/L (ref 9–39)
BILIRUB SERPL-MCNC: 0.5 MG/DL (ref 0–1.2)
BNP SERPL-MCNC: 49 PG/ML (ref 0–99)
BUN SERPL-MCNC: 13 MG/DL (ref 6–23)
CALCIUM SERPL-MCNC: 9.5 MG/DL (ref 8.6–10.6)
CHLORIDE SERPL-SCNC: 104 MMOL/L (ref 98–107)
CHOLEST SERPL-MCNC: 158 MG/DL (ref 0–199)
CHOLESTEROL/HDL RATIO: 3.6
CO2 SERPL-SCNC: 32 MMOL/L (ref 21–32)
CREAT SERPL-MCNC: 1.2 MG/DL (ref 0.5–1.3)
CREAT UR-MCNC: 248.5 MG/DL (ref 20–370)
EGFRCR SERPLBLD CKD-EPI 2021: 73 ML/MIN/1.73M*2
GLUCOSE SERPL-MCNC: 106 MG/DL (ref 74–99)
HDLC SERPL-MCNC: 43.7 MG/DL
LDLC SERPL CALC-MCNC: 89 MG/DL
MICROALBUMIN UR-MCNC: 12.5 MG/L
MICROALBUMIN/CREAT UR: 5 UG/MG CREAT
NON HDL CHOLESTEROL: 114 MG/DL (ref 0–149)
POTASSIUM SERPL-SCNC: 3.5 MMOL/L (ref 3.5–5.3)
PROT SERPL-MCNC: 7 G/DL (ref 6.4–8.2)
SODIUM SERPL-SCNC: 146 MMOL/L (ref 136–145)
TRIGL SERPL-MCNC: 128 MG/DL (ref 0–149)
TSH SERPL-ACNC: 0.48 MIU/L (ref 0.44–3.98)
VLDL: 26 MG/DL (ref 0–40)

## 2024-10-29 PROCEDURE — 90656 IIV3 VACC NO PRSV 0.5 ML IM: CPT | Performed by: INTERNAL MEDICINE

## 2024-10-29 PROCEDURE — 36415 COLL VENOUS BLD VENIPUNCTURE: CPT

## 2024-10-29 PROCEDURE — 82043 UR ALBUMIN QUANTITATIVE: CPT

## 2024-10-29 PROCEDURE — 82570 ASSAY OF URINE CREATININE: CPT

## 2024-10-29 PROCEDURE — 3079F DIAST BP 80-89 MM HG: CPT | Performed by: INTERNAL MEDICINE

## 2024-10-29 PROCEDURE — 90471 IMMUNIZATION ADMIN: CPT | Performed by: INTERNAL MEDICINE

## 2024-10-29 PROCEDURE — 3075F SYST BP GE 130 - 139MM HG: CPT | Performed by: INTERNAL MEDICINE

## 2024-10-29 PROCEDURE — 84443 ASSAY THYROID STIM HORMONE: CPT

## 2024-10-29 PROCEDURE — 80061 LIPID PANEL: CPT

## 2024-10-29 PROCEDURE — 80053 COMPREHEN METABOLIC PANEL: CPT

## 2024-10-29 PROCEDURE — 99214 OFFICE O/P EST MOD 30 MIN: CPT | Performed by: INTERNAL MEDICINE

## 2024-10-29 PROCEDURE — 83880 ASSAY OF NATRIURETIC PEPTIDE: CPT

## 2024-10-29 PROCEDURE — 3008F BODY MASS INDEX DOCD: CPT | Performed by: INTERNAL MEDICINE

## 2024-10-29 RX ORDER — DOXAZOSIN 2 MG/1
4 TABLET ORAL DAILY
Qty: 180 TABLET | Refills: 3 | Status: SHIPPED | OUTPATIENT
Start: 2024-10-29 | End: 2025-10-29

## 2024-10-29 RX ORDER — AMLODIPINE BESYLATE 10 MG/1
5 TABLET ORAL DAILY
Qty: 90 TABLET | Refills: 1 | Status: SHIPPED | OUTPATIENT
Start: 2024-10-29

## 2024-10-29 ASSESSMENT — PATIENT HEALTH QUESTIONNAIRE - PHQ9
2. FEELING DOWN, DEPRESSED OR HOPELESS: NOT AT ALL
SUM OF ALL RESPONSES TO PHQ9 QUESTIONS 1 & 2: 0
1. LITTLE INTEREST OR PLEASURE IN DOING THINGS: NOT AT ALL

## 2024-11-04 ENCOUNTER — TELEPHONE (OUTPATIENT)
Dept: GASTROENTEROLOGY | Facility: CLINIC | Age: 54
End: 2024-11-04
Payer: COMMERCIAL

## 2024-11-04 ENCOUNTER — PATIENT OUTREACH (OUTPATIENT)
Dept: CARE COORDINATION | Facility: CLINIC | Age: 54
End: 2024-11-04
Payer: COMMERCIAL

## 2024-11-04 NOTE — TELEPHONE ENCOUNTER
Patient called and wanted to schedule colonoscopy with Dr. Terry. There is an order for the procedure, but the patient hasn't established care with any of the providers at this office. Thank you.

## 2024-12-19 ENCOUNTER — OFFICE VISIT (OUTPATIENT)
Dept: UROLOGY | Facility: HOSPITAL | Age: 54
End: 2024-12-19
Payer: COMMERCIAL

## 2024-12-19 DIAGNOSIS — Z96.0 S/P INSERTION OF PENILE IMPLANT: Primary | ICD-10-CM

## 2024-12-19 PROCEDURE — 99213 OFFICE O/P EST LOW 20 MIN: CPT | Performed by: UROLOGY

## 2024-12-19 NOTE — PROGRESS NOTES
FUV    Last seen - 6/27/24     HISTORY OF PRESENT ILLNESS:   Lennox Domingo is a 53 y.o. male who is being seen today for POV    h/o Pca s/p RALP with Dr. Martinez.  PSA UDT     Pt with h/o bladder neck contracture s/p BNI Feb 2022.  Had AUS in sept 2022 with Dr. Padilla.       Pt with severe ED. Failed PDE5i, not able to do ICI    Pt had   CX 21cm +1cm RTE, 100 mL conceal reservoir left submuscular through counter incision on 2/7/24.  Discharged home from pacu.      2/22/24 - Been doing ok.  No f/c. Urinating ok.  Pain managable    3/21/24 - Having issues inflating.  No f/c.  Urinating ok    6/27/24 - Still having issues inflating    12/19/24 - has seen Dr Padilla, doing better with leakage - discussed possible downsizing cuff.  Using IPP, going ok.      PAST MEDICAL HISTORY:  Past Medical History:   Diagnosis Date    Asthma     Cardiology follow-up encounter 11/06/2023    James C Ramicone, DO    Erectile dysfunction after radical prostatectomy     Hypertension     Lumbar radiculopathy     Paroxysmal atrial fibrillation (Multi)     Paroxysmal atrial fibrillation with rapid ventricular response (Multi) 11/03/2023    Prostate cancer (Multi)     Sleep apnea     does not use his CPAP       PAST SURGICAL HISTORY:  Past Surgical History:   Procedure Laterality Date    ABLATION OF DYSRHYTHMIC FOCUS  2014    Catheter Ablation    CYSTOSCOPY  02/10/2022    PROSTATECTOMY  2021    URINARY SPHINCTER IMPLANT  07/19/2022        ALLERGIES:   Allergies   Allergen Reactions    Shellfish Derived Anaphylaxis    Iodine Unknown        MEDICATIONS:   Current Outpatient Medications   Medication Instructions    acetaminophen (TYLENOL) 650 mg, oral, Every 6 hours PRN    albuterol 2.5 mg /3 mL (0.083 %) nebulizer solution inhale contents of 1 vial ( 3 milliliters ) in nebulizer by mouth...  (REFER TO PRESCRIPTION NOTES).    albuterol 90 mcg/actuation inhaler 2 puffs, 3 times daily RT    amLODIPine (NORVASC) 5 mg, oral, Daily     bumetanide (BUMEX) 2 mg, oral, Daily    doxazosin (CARDURA) 4 mg, oral, Daily    flecainide (TAMBOCOR) 100 mg, oral, 2 times daily    gabapentin (NEURONTIN) 300 mg, oral, 3 times daily    hydrALAZINE (APRESOLINE) 50 mg, 3 times daily    metoprolol tartrate (LOPRESSOR) 50 mg, oral, 2 times daily    montelukast (SINGULAIR) 10 mg, Daily    olmesartan (BENICAR) 40 mg, oral, Daily    Trelegy Ellipta 200-62.5-25 mcg blister with device INHALE ONE INHALATION BY MOUTH ONCE DAILY        PHYSICAL EXAM:  There were no vitals taken for this visit.  Constitutional: Patient appears well-developed and well-nourished. No distress.    Pulmonary/Chest: Effort normal. No respiratory distress.   Abdominal: Soft, ND NT  : Tips symmetrical and mid glans, pump midline and dependent, incisions healing well.  Musculoskeletal: Normal range of motion.    Neurological: Alert and oriented to person, place, and time.  Psychiatric: Normal mood and affect. Behavior is normal. Thought content normal.      Labs  Lab Results   Component Value Date    PSA <0.10 03/27/2024     Lab Results   Component Value Date    GFRMALE 74 08/10/2023     Lab Results   Component Value Date    CREATININE 1.20 10/29/2024     Lab Results   Component Value Date    CHOL 158 10/29/2024     Lab Results   Component Value Date    HDL 43.7 10/29/2024     Lab Results   Component Value Date    CHHDL 3.6 10/29/2024     Lab Results   Component Value Date    LDLF 66 08/10/2023     Lab Results   Component Value Date    VLDL 26 10/29/2024     Lab Results   Component Value Date    TRIG 128 10/29/2024     Lab Results   Component Value Date    HGBA1C 5.8 (H) 01/31/2024     Lab Results   Component Value Date    HCT 42.3 01/31/2024     Assessment:      1. S/P insertion of penile implant            Lennox DRUMMOND Jose L is a 53 y.o. male here for FUV     Plan:   Cycle daily, instructed on proper use  No restrictions    Cont fu with Dr Padilla.  See me as needed

## 2025-01-01 DIAGNOSIS — I10 PRIMARY HYPERTENSION: Chronic | ICD-10-CM

## 2025-01-02 RX ORDER — AMLODIPINE BESYLATE 5 MG/1
5 TABLET ORAL DAILY
Qty: 90 TABLET | Refills: 3 | Status: SHIPPED | OUTPATIENT
Start: 2025-01-02 | End: 2026-01-02

## 2025-01-28 ENCOUNTER — APPOINTMENT (OUTPATIENT)
Dept: PRIMARY CARE | Facility: CLINIC | Age: 55
End: 2025-01-28
Payer: COMMERCIAL

## 2025-01-28 VITALS
WEIGHT: 315 LBS | BODY MASS INDEX: 40.43 KG/M2 | OXYGEN SATURATION: 91 % | SYSTOLIC BLOOD PRESSURE: 151 MMHG | DIASTOLIC BLOOD PRESSURE: 92 MMHG | HEIGHT: 74 IN | HEART RATE: 63 BPM

## 2025-01-28 DIAGNOSIS — L98.9 SKIN LESION: ICD-10-CM

## 2025-01-28 DIAGNOSIS — J45.20 MILD INTERMITTENT ASTHMA WITHOUT COMPLICATION (HHS-HCC): ICD-10-CM

## 2025-01-28 DIAGNOSIS — I48.0 PAROXYSMAL ATRIAL FIBRILLATION WITH RAPID VENTRICULAR RESPONSE (MULTI): Chronic | ICD-10-CM

## 2025-01-28 DIAGNOSIS — I10 PRIMARY HYPERTENSION: Primary | Chronic | ICD-10-CM

## 2025-01-28 PROBLEM — Z79.899 HIGH RISK MEDICATION USE: Status: ACTIVE | Noted: 2025-01-28

## 2025-01-28 PROCEDURE — 3008F BODY MASS INDEX DOCD: CPT | Performed by: INTERNAL MEDICINE

## 2025-01-28 PROCEDURE — 99214 OFFICE O/P EST MOD 30 MIN: CPT | Performed by: INTERNAL MEDICINE

## 2025-01-28 PROCEDURE — 3077F SYST BP >= 140 MM HG: CPT | Performed by: INTERNAL MEDICINE

## 2025-01-28 PROCEDURE — 3080F DIAST BP >= 90 MM HG: CPT | Performed by: INTERNAL MEDICINE

## 2025-01-28 RX ORDER — POTASSIUM CHLORIDE 750 MG/1
10 TABLET, FILM COATED, EXTENDED RELEASE ORAL DAILY
Qty: 90 TABLET | Refills: 3 | Status: SHIPPED | OUTPATIENT
Start: 2025-01-28 | End: 2026-01-28

## 2025-01-28 RX ORDER — DOXAZOSIN 4 MG/1
4 TABLET ORAL DAILY
Qty: 90 TABLET | Refills: 3 | Status: SHIPPED | OUTPATIENT
Start: 2025-01-28 | End: 2026-01-28

## 2025-01-28 ASSESSMENT — PATIENT HEALTH QUESTIONNAIRE - PHQ9
1. LITTLE INTEREST OR PLEASURE IN DOING THINGS: NOT AT ALL
SUM OF ALL RESPONSES TO PHQ9 QUESTIONS 1 AND 2: 0
2. FEELING DOWN, DEPRESSED OR HOPELESS: NOT AT ALL

## 2025-01-28 ASSESSMENT — ENCOUNTER SYMPTOMS: DEPRESSION: 0

## 2025-01-28 NOTE — PROGRESS NOTES
"Subjective   Patient ID: Lennox Domingo is a 54 y.o. male who presents for Follow-up.    HPI   Leg swelling much better on lower amlodipine.  Hasn't been taking doxazosin for the past few weeks.  Not checking BP at home.  No HA, dizziness, CP.    Using trelegy daily    Has two skin lesions on legs he would like to see Derm for, referred    Review of Systems    Objective   BP (!) 151/92   Pulse 63   Ht 1.88 m (6' 2\")   Wt (!) 157 kg (347 lb 3.2 oz)   SpO2 91%   BMI 44.58 kg/m²     Physical Exam  Constitutional:       Appearance: Normal appearance.   Cardiovascular:      Rate and Rhythm: Normal rate and regular rhythm.      Heart sounds: No murmur heard.  Pulmonary:      Effort: Pulmonary effort is normal.      Breath sounds: Normal breath sounds.   Musculoskeletal:      Right lower leg: No edema.      Left lower leg: No edema.   Skin:     Comments: 1cm raised scaly lesion on R calf and 1cm typical appearing nevus on L calf   Neurological:      General: No focal deficit present.      Mental Status: He is alert.      Gait: Gait normal.         Assessment/Plan   Problem List Items Addressed This Visit             ICD-10-CM    Paroxysmal atrial fibrillation with rapid ventricular response (Multi) (Chronic) I48.0    Hypertension - Primary (Chronic) I10    Relevant Medications    potassium chloride CR (Klor-Con) 10 mEq ER tablet    doxazosin (Cardura) 4 mg tablet    Other Relevant Orders    Basic Metabolic Panel    Asthma J45.909    Skin lesion L98.9    Relevant Orders    Referral to Dermatology          HTN   -Restart back on doxazosin  -Add potassium given borderline low K level, BMP in 1-2 weeks     Afib - s/p ablation  -Continue current management  -Follows with Dr Ramicone     Asthma - Continue trelegy. F/u with Pulmonary.     Lumbar radiculopathy  -Following with chiropractor and workers comp     Prostate cancer, urinary incontinence  -Seeing Urology     REJI  -Cannot tolerate CPAP. Previously referred Sleep " Surgery.     Leg swelling - controlled now  -On bumex from Cardiology  -Better on lower amlodipine     Health Maintenance  -Colonoscopy 11/28/12 - overdue, order is in and pt is attempting to schedule  -Immunizations   -Tdap 2016   -Pneumovax 2016     f/u 3 months

## 2025-01-28 NOTE — PROGRESS NOTES
"    History Of Present Illness:      This is a 54-year-old male with history of hypertension and paroxysmal atrial fibrillation.  He has been taking flecainide for maintenance of sinus rhythm.  His last office visit was July 24, 2024.  The lower extremity edema has resolved and he is using Bumex 2 mg daily as needed.  No other cardiac complaints at this time.    Review of Systems  Other review of systems negative  Last Recorded Vitals:      2/28/2024     2:44 PM 3/27/2024     9:48 AM 6/11/2024     2:38 PM 7/24/2024    10:18 AM 10/9/2024     9:01 AM 10/29/2024    10:27 AM 1/28/2025     8:35 AM   Vitals   Systolic  160  144 160 135 151   Diastolic  90  90 75 84 92   BP Location    Right arm  Right arm    Heart Rate  84  73 83 61 63   Temp  36.7 °C (98.1 °F)   37.1 °C (98.7 °F)     Height 1.88 m (6' 2\")  1.88 m (6' 2\") 1.88 m (6' 2\")  1.88 m (6' 2\") 1.88 m (6' 2\")   Weight (lb) 319 326 341.1 345 349 350.4 347.2   BMI 40.96 kg/m2 41.86 kg/m2 43.79 kg/m2 44.3 kg/m2 44.81 kg/m2 44.99 kg/m2 44.58 kg/m2   BSA (m2) 2.75 m2 2.78 m2 2.84 m2 2.85 m2 2.87 m2 2.88 m2 2.86 m2   Visit Report  Report  Report Report Report Report     Allergies:  Shellfish derived and Iodine  Outpatient Medications:  Current Outpatient Medications   Medication Instructions    acetaminophen (TYLENOL) 650 mg, oral, Every 6 hours PRN    albuterol 2.5 mg /3 mL (0.083 %) nebulizer solution inhale contents of 1 vial ( 3 milliliters ) in nebulizer by mouth...  (REFER TO PRESCRIPTION NOTES).    albuterol 90 mcg/actuation inhaler 2 puffs, 3 times daily RT    amLODIPine (NORVASC) 5 mg, oral, Daily    bumetanide (BUMEX) 2 mg, oral, Daily    doxazosin (CARDURA) 4 mg, oral, Daily    flecainide (TAMBOCOR) 100 mg, oral, 2 times daily    gabapentin (NEURONTIN) 300 mg, oral, 3 times daily    hydrALAZINE (APRESOLINE) 50 mg, 3 times daily    metoprolol tartrate (LOPRESSOR) 50 mg, oral, 2 times daily    montelukast (SINGULAIR) 10 mg, Daily    olmesartan (BENICAR) 40 mg, " oral, Daily    potassium chloride CR (Klor-Con) 10 mEq ER tablet 10 mEq, oral, Daily, Do not crush, chew, or split.    Trelegy Ellipta 200-62.5-25 mcg blister with device INHALE ONE INHALATION BY MOUTH ONCE DAILY       Physical Exam:    General Appearance:  Alert, oriented, no distress  Skin:  Warm and dry  Head and Neck:  No elevation of JVP, no carotid bruits  Cardiac Exam:  Rhythm is regular, S1 and S2 are normal, no murmur S3 or S4  Lungs:  Clear to auscultation  Extremities:  no edema  Neurologic:  No focal deficits  Psychiatric:  Appropriate mood and behavior    Lab Results:    CMP:  Recent Labs     10/29/24  1108 01/31/24  1355 08/10/23  1110 12/01/22  1313 10/21/22  1209 07/11/22  1503 02/07/22  1544 11/11/21  1206 10/14/19  0936 06/16/19  0530 06/14/19  0555 06/13/19  0512   * 142 140 143 142 141 141 142   < > 139 137 135*   K 3.5 4.0 3.6 3.8 4.3 3.5 3.5 3.9   < > 4.5 4.5 4.3    104 106 103 102 102 103 104   < > 105 105 101   CO2 32 32 27 30 31 29 26 32   < > 27 26 25   ANIONGAP 14 10 11 14 13 14 16 10   < > 12 11 13   BUN 13 17 17 11 14 13 21 10   < > 18 25* 15   CREATININE 1.20 1.22 1.19 1.09 1.37* 1.20 1.25 1.11   < > 1.00 1.08 0.99   EGFR 73 71  --   --   --   --   --   --   --   --   --   --    MG  --   --   --   --   --   --   --   --   --  2.54* 2.52* 2.46*    < > = values in this interval not displayed.     Recent Labs     10/29/24  1108 01/31/24  1355 08/10/23  1110 10/21/22  1209 10/11/21  1212   ALBUMIN 4.5 4.0 4.3 4.4 4.6   ALKPHOS 55 48 51 56 57   ALT 16 14 13 16 14   AST 15 13 15 15 15   BILITOT 0.5 0.4 0.3 0.5 0.5     CBC:  Recent Labs     01/31/24  1355 08/10/23  1110 10/21/22  1209 07/11/22  1503 02/07/22  1544 10/11/21  1212 10/13/20  1154 10/14/19  0936   WBC 8.5 7.9 8.1 7.7 10.8 8.2 6.4 5.8   HGB 13.4* 13.9 15.5 15.2 15.3 16.0 14.3 15.7   HCT 42.3 44.2 48.1 45.8 46.7 48.9 45.5 48.8    309 337 306 337 343 347 309   MCV 89 88 90 86 89 91 86 90     COAG:   Recent Labs      01/31/24  1355 06/12/19  1259   INR 0.9 1.1     Cardiology Tests (personally reviewed):    Echocardiogram May 2023: Normal left ventricular function  EKG January 31, 2024: Normal sinus rhythm    Assessment/Plan   Problem List Items Addressed This Visit             ICD-10-CM    Paroxysmal atrial fibrillation with rapid ventricular response (Multi) (Chronic) I48.0     Lennox is in sinus rhythm today and has been doing well on flecainide.  He has a remote history of pulmonary vein isolation back in 2016/2017 and he has been maintaining sinus rhythm on flecainide with rare occurrences of AF.  Continue with Eliquis and flecainide.  EP follow-up in 10 to 12 months.         Relevant Orders    ECG 12 lead (Clinic Performed) (Completed)    Hypertension (Chronic) I10     Blood pressure is reasonably controlled on current medication regimen.         High risk medication use - Primary Z79.899     No bleeding problems on Eliquis.  No side effects on flecainide.          James C Ramicone, DO

## 2025-01-29 ENCOUNTER — APPOINTMENT (OUTPATIENT)
Dept: CARDIOLOGY | Facility: CLINIC | Age: 55
End: 2025-01-29
Payer: COMMERCIAL

## 2025-01-29 VITALS
OXYGEN SATURATION: 98 % | WEIGHT: 315 LBS | HEIGHT: 74 IN | HEART RATE: 68 BPM | BODY MASS INDEX: 40.43 KG/M2 | DIASTOLIC BLOOD PRESSURE: 82 MMHG | SYSTOLIC BLOOD PRESSURE: 140 MMHG

## 2025-01-29 DIAGNOSIS — I10 PRIMARY HYPERTENSION: Chronic | ICD-10-CM

## 2025-01-29 DIAGNOSIS — Z79.899 HIGH RISK MEDICATION USE: Primary | ICD-10-CM

## 2025-01-29 DIAGNOSIS — I48.0 PAROXYSMAL ATRIAL FIBRILLATION WITH RAPID VENTRICULAR RESPONSE (MULTI): Chronic | ICD-10-CM

## 2025-01-29 PROCEDURE — 93005 ELECTROCARDIOGRAM TRACING: CPT | Performed by: INTERNAL MEDICINE

## 2025-01-29 PROCEDURE — 3008F BODY MASS INDEX DOCD: CPT | Performed by: INTERNAL MEDICINE

## 2025-01-29 PROCEDURE — 99214 OFFICE O/P EST MOD 30 MIN: CPT | Performed by: INTERNAL MEDICINE

## 2025-01-29 PROCEDURE — 93010 ELECTROCARDIOGRAM REPORT: CPT | Performed by: INTERNAL MEDICINE

## 2025-01-29 PROCEDURE — 3079F DIAST BP 80-89 MM HG: CPT | Performed by: INTERNAL MEDICINE

## 2025-01-29 PROCEDURE — 3077F SYST BP >= 140 MM HG: CPT | Performed by: INTERNAL MEDICINE

## 2025-01-29 PROCEDURE — 99214 OFFICE O/P EST MOD 30 MIN: CPT | Mod: 25 | Performed by: INTERNAL MEDICINE

## 2025-01-29 NOTE — ASSESSMENT & PLAN NOTE
Lennox is in sinus rhythm today and has been doing well on flecainide.  He has a remote history of pulmonary vein isolation back in 2016/2017 and he has been maintaining sinus rhythm on flecainide with rare occurrences of AF.  Continue with Eliquis and flecainide.  EP follow-up in 10 to 12 months.

## 2025-01-29 NOTE — LETTER
"January 29, 2025     Keegan Madrid MD  [address]  [facility] [address]    Patient: Lenonx Domingo   YOB: 1970   Date of Visit: 1/29/2025       Dear Dr. Keegan Madrid MD:    Thank you for referring Lennox Domingo to me for evaluation. Below are my notes for this consultation.  If you have questions, please do not hesitate to call me. I look forward to following your patient along with you.       Sincerely,     James C Ramicone,       CC: No Recipients  ______________________________________________________________________________________        History Of Present Illness:      This is a 54-year-old male with history of hypertension and paroxysmal atrial fibrillation.  He has been taking flecainide for maintenance of sinus rhythm.  His last office visit was July 24, 2024.  The lower extremity edema has resolved and he is using Bumex 2 mg daily as needed.  No other cardiac complaints at this time.    Review of Systems  Other review of systems negative  Last Recorded Vitals:      2/28/2024     2:44 PM 3/27/2024     9:48 AM 6/11/2024     2:38 PM 7/24/2024    10:18 AM 10/9/2024     9:01 AM 10/29/2024    10:27 AM 1/28/2025     8:35 AM   Vitals   Systolic  160  144 160 135 151   Diastolic  90  90 75 84 92   BP Location    Right arm  Right arm    Heart Rate  84  73 83 61 63   Temp  36.7 °C (98.1 °F)   37.1 °C (98.7 °F)     Height 1.88 m (6' 2\")  1.88 m (6' 2\") 1.88 m (6' 2\")  1.88 m (6' 2\") 1.88 m (6' 2\")   Weight (lb) 319 326 341.1 345 349 350.4 347.2   BMI 40.96 kg/m2 41.86 kg/m2 43.79 kg/m2 44.3 kg/m2 44.81 kg/m2 44.99 kg/m2 44.58 kg/m2   BSA (m2) 2.75 m2 2.78 m2 2.84 m2 2.85 m2 2.87 m2 2.88 m2 2.86 m2   Visit Report  Report  Report Report Report Report     Allergies:  Shellfish derived and Iodine  Outpatient Medications:  Current Outpatient Medications   Medication Instructions   • acetaminophen (TYLENOL) 650 mg, oral, Every 6 hours PRN   • albuterol 2.5 mg /3 mL (0.083 " %) nebulizer solution inhale contents of 1 vial ( 3 milliliters ) in nebulizer by mouth...  (REFER TO PRESCRIPTION NOTES).   • albuterol 90 mcg/actuation inhaler 2 puffs, 3 times daily RT   • amLODIPine (NORVASC) 5 mg, oral, Daily   • bumetanide (BUMEX) 2 mg, oral, Daily   • doxazosin (CARDURA) 4 mg, oral, Daily   • flecainide (TAMBOCOR) 100 mg, oral, 2 times daily   • gabapentin (NEURONTIN) 300 mg, oral, 3 times daily   • hydrALAZINE (APRESOLINE) 50 mg, 3 times daily   • metoprolol tartrate (LOPRESSOR) 50 mg, oral, 2 times daily   • montelukast (SINGULAIR) 10 mg, Daily   • olmesartan (BENICAR) 40 mg, oral, Daily   • potassium chloride CR (Klor-Con) 10 mEq ER tablet 10 mEq, oral, Daily, Do not crush, chew, or split.   • Trelegy Ellipta 200-62.5-25 mcg blister with device INHALE ONE INHALATION BY MOUTH ONCE DAILY       Physical Exam:    General Appearance:  Alert, oriented, no distress  Skin:  Warm and dry  Head and Neck:  No elevation of JVP, no carotid bruits  Cardiac Exam:  Rhythm is regular, S1 and S2 are normal, no murmur S3 or S4  Lungs:  Clear to auscultation  Extremities:  no edema  Neurologic:  No focal deficits  Psychiatric:  Appropriate mood and behavior    Lab Results:    CMP:  Recent Labs     10/29/24  1108 01/31/24  1355 08/10/23  1110 12/01/22  1313 10/21/22  1209 07/11/22  1503 02/07/22  1544 11/11/21  1206 10/14/19  0936 06/16/19  0530 06/14/19  0555 06/13/19  0512   * 142 140 143 142 141 141 142   < > 139 137 135*   K 3.5 4.0 3.6 3.8 4.3 3.5 3.5 3.9   < > 4.5 4.5 4.3    104 106 103 102 102 103 104   < > 105 105 101   CO2 32 32 27 30 31 29 26 32   < > 27 26 25   ANIONGAP 14 10 11 14 13 14 16 10   < > 12 11 13   BUN 13 17 17 11 14 13 21 10   < > 18 25* 15   CREATININE 1.20 1.22 1.19 1.09 1.37* 1.20 1.25 1.11   < > 1.00 1.08 0.99   EGFR 73 71  --   --   --   --   --   --   --   --   --   --    MG  --   --   --   --   --   --   --   --   --  2.54* 2.52* 2.46*    < > = values in this interval  not displayed.     Recent Labs     10/29/24  1108 01/31/24  1355 08/10/23  1110 10/21/22  1209 10/11/21  1212   ALBUMIN 4.5 4.0 4.3 4.4 4.6   ALKPHOS 55 48 51 56 57   ALT 16 14 13 16 14   AST 15 13 15 15 15   BILITOT 0.5 0.4 0.3 0.5 0.5     CBC:  Recent Labs     01/31/24  1355 08/10/23  1110 10/21/22  1209 07/11/22  1503 02/07/22  1544 10/11/21  1212 10/13/20  1154 10/14/19  0936   WBC 8.5 7.9 8.1 7.7 10.8 8.2 6.4 5.8   HGB 13.4* 13.9 15.5 15.2 15.3 16.0 14.3 15.7   HCT 42.3 44.2 48.1 45.8 46.7 48.9 45.5 48.8    309 337 306 337 343 347 309   MCV 89 88 90 86 89 91 86 90     COAG:   Recent Labs     01/31/24  1355 06/12/19  1259   INR 0.9 1.1     Cardiology Tests (personally reviewed):    Echocardiogram May 2023: Normal left ventricular function  EKG January 31, 2024: Normal sinus rhythm    Assessment/Plan  Problem List Items Addressed This Visit             ICD-10-CM    Paroxysmal atrial fibrillation with rapid ventricular response (Multi) (Chronic) I48.0     Lennox is in sinus rhythm today and has been doing well on flecainide.  He has a remote history of pulmonary vein isolation back in 2016/2017 and he has been maintaining sinus rhythm on flecainide with rare occurrences of AF.  Continue with Eliquis and flecainide.  EP follow-up in 10 to 12 months.         Relevant Orders    ECG 12 lead (Clinic Performed) (Completed)    Hypertension (Chronic) I10     Blood pressure is reasonably controlled on current medication regimen.         High risk medication use - Primary Z79.899     No bleeding problems on Eliquis.  No side effects on flecainide.          James C Ramicone, DO

## 2025-01-30 LAB
ATRIAL RATE: 81 BPM
P AXIS: 76 DEGREES
P OFFSET: 201 MS
P ONSET: 131 MS
PR INTERVAL: 186 MS
Q ONSET: 224 MS
QRS COUNT: 13 BEATS
QRS DURATION: 100 MS
QT INTERVAL: 406 MS
QTC CALCULATION(BAZETT): 471 MS
QTC FREDERICIA: 448 MS
R AXIS: 101 DEGREES
T AXIS: 36 DEGREES
T OFFSET: 427 MS
VENTRICULAR RATE: 81 BPM

## 2025-02-03 ENCOUNTER — TELEPHONE (OUTPATIENT)
Dept: PRIMARY CARE | Facility: CLINIC | Age: 55
End: 2025-02-03
Payer: COMMERCIAL

## 2025-02-07 ENCOUNTER — PATIENT OUTREACH (OUTPATIENT)
Dept: PRIMARY CARE | Facility: CLINIC | Age: 55
End: 2025-02-07
Payer: COMMERCIAL

## 2025-02-07 NOTE — PROGRESS NOTES
Discharge facility: [facility]   Discharge diagnosis:  Acute asthma exacerbation   Influenza A      Admission date: 2/1/25  Discharge date: 2/6/25    PCP Appointment Date: 5/1/25, Unsuccessful attempts x2 to reach patient for PCP Follow-up, message sent to office  Specialist Appointment Date: 4/9/25 urology  Hospital Encounter and Summary: Linked    Transitional Care Management Enrollment with Eleanor Hanna RN (02/07/2025)   Two attempts were made to reach patient within two business days after discharge. Voicemail left with contact information for patient to call back with any non-emergent questions or concerns.

## 2025-02-11 ENCOUNTER — TELEPHONE (OUTPATIENT)
Dept: PRIMARY CARE | Facility: CLINIC | Age: 55
End: 2025-02-11
Payer: COMMERCIAL

## 2025-02-18 ENCOUNTER — APPOINTMENT (OUTPATIENT)
Dept: PRIMARY CARE | Facility: CLINIC | Age: 55
End: 2025-02-18
Payer: COMMERCIAL

## 2025-02-18 VITALS
DIASTOLIC BLOOD PRESSURE: 86 MMHG | HEIGHT: 74 IN | OXYGEN SATURATION: 94 % | WEIGHT: 315 LBS | HEART RATE: 65 BPM | SYSTOLIC BLOOD PRESSURE: 157 MMHG | BODY MASS INDEX: 40.43 KG/M2

## 2025-02-18 DIAGNOSIS — J45.20 MILD INTERMITTENT ASTHMA WITHOUT COMPLICATION (HHS-HCC): Primary | ICD-10-CM

## 2025-02-18 DIAGNOSIS — J45.20 MILD INTERMITTENT ASTHMA WITHOUT COMPLICATION (HHS-HCC): ICD-10-CM

## 2025-02-18 DIAGNOSIS — I10 PRIMARY HYPERTENSION: Chronic | ICD-10-CM

## 2025-02-18 DIAGNOSIS — J10.1 INFLUENZA A: Primary | ICD-10-CM

## 2025-02-18 DIAGNOSIS — G47.33 OBSTRUCTIVE SLEEP APNEA SYNDROME: ICD-10-CM

## 2025-02-18 DIAGNOSIS — I48.0 PAROXYSMAL ATRIAL FIBRILLATION WITH RAPID VENTRICULAR RESPONSE (MULTI): Chronic | ICD-10-CM

## 2025-02-18 PROCEDURE — 3008F BODY MASS INDEX DOCD: CPT | Performed by: INTERNAL MEDICINE

## 2025-02-18 PROCEDURE — 3079F DIAST BP 80-89 MM HG: CPT | Performed by: INTERNAL MEDICINE

## 2025-02-18 PROCEDURE — 99495 TRANSJ CARE MGMT MOD F2F 14D: CPT | Performed by: INTERNAL MEDICINE

## 2025-02-18 PROCEDURE — 3077F SYST BP >= 140 MM HG: CPT | Performed by: INTERNAL MEDICINE

## 2025-02-18 RX ORDER — FLUTICASONE FUROATE, UMECLIDINIUM BROMIDE AND VILANTEROL TRIFENATATE 200; 62.5; 25 UG/1; UG/1; UG/1
1 POWDER RESPIRATORY (INHALATION)
Qty: 180 EACH | Refills: 3 | Status: SHIPPED | OUTPATIENT
Start: 2025-02-18

## 2025-02-18 ASSESSMENT — PATIENT HEALTH QUESTIONNAIRE - PHQ9
SUM OF ALL RESPONSES TO PHQ9 QUESTIONS 1 & 2: 0
2. FEELING DOWN, DEPRESSED OR HOPELESS: NOT AT ALL
1. LITTLE INTEREST OR PLEASURE IN DOING THINGS: NOT AT ALL

## 2025-02-18 NOTE — PROGRESS NOTES
"Subjective   Patient ID: Lennox Domingo is a 54 y.o. male who presents for Follow-up, hospital discharge (In hospital for 3 days flu A), Dizziness, and Nausea.    HPI   Admitted to [facility] for influenza and asthma exacerbation 2/1-2/6/25.  Treated with IV steroids.  Went in to afib while in hospital. Then went back in to NSR.  Discharged on prednisone, last dose is tomorrow. He is back to baseline from resp standpoint. Denies wheezing, dyspnea, or cough.  No fevers.    Has been compliant with trelegy, and was before the hospital.    Can't tolerate CPAP  He sleeps with 2L O2 at night    Got new BP cuff but hasn't been checking.      Review of Systems    Objective   /86 (BP Location: Right arm, Patient Position: Sitting)   Pulse 65   Ht 1.88 m (6' 2\")   Wt (!) 159 kg (350 lb)   SpO2 94%   BMI 44.94 kg/m²     Physical Exam  Constitutional:       Appearance: Normal appearance.   Cardiovascular:      Rate and Rhythm: Normal rate and regular rhythm.      Heart sounds: No murmur heard.  Pulmonary:      Effort: Pulmonary effort is normal.      Breath sounds: Normal breath sounds.   Musculoskeletal:      Right lower leg: Edema (trace) present.      Left lower leg: Edema (trace) present.   Neurological:      Mental Status: He is alert.         Assessment/Plan   Problem List Items Addressed This Visit             ICD-10-CM    Paroxysmal atrial fibrillation with rapid ventricular response (Multi) (Chronic) I48.0    Hypertension (Chronic) I10    Asthma J45.909    Obstructive sleep apnea syndrome G47.33    Influenza A - Primary J10.1          Influenza resolved. Asthma exacerbation secondary to influenza resolved, complete prednisone.  HTN - Elevated today but pt on steroids. We just raised doxazosin last time. He will start monitoring BP at home and let me know if running high.  "

## 2025-02-19 DIAGNOSIS — I10 PRIMARY HYPERTENSION: ICD-10-CM

## 2025-02-19 RX ORDER — METOPROLOL TARTRATE 50 MG/1
50 TABLET ORAL 2 TIMES DAILY
Qty: 180 TABLET | Refills: 3 | Status: SHIPPED | OUTPATIENT
Start: 2025-02-19

## 2025-02-20 ENCOUNTER — PATIENT OUTREACH (OUTPATIENT)
Dept: PRIMARY CARE | Facility: CLINIC | Age: 55
End: 2025-02-20
Payer: COMMERCIAL

## 2025-02-28 ENCOUNTER — OFFICE VISIT (OUTPATIENT)
Dept: CARDIOLOGY | Facility: CLINIC | Age: 55
End: 2025-02-28
Payer: COMMERCIAL

## 2025-02-28 VITALS
WEIGHT: 315 LBS | HEART RATE: 70 BPM | DIASTOLIC BLOOD PRESSURE: 72 MMHG | BODY MASS INDEX: 40.43 KG/M2 | OXYGEN SATURATION: 94 % | SYSTOLIC BLOOD PRESSURE: 138 MMHG | HEIGHT: 74 IN

## 2025-02-28 DIAGNOSIS — I10 PRIMARY HYPERTENSION: Chronic | ICD-10-CM

## 2025-02-28 DIAGNOSIS — I48.0 PAROXYSMAL ATRIAL FIBRILLATION WITH RAPID VENTRICULAR RESPONSE (MULTI): Primary | Chronic | ICD-10-CM

## 2025-02-28 DIAGNOSIS — J10.1 INFLUENZA A: ICD-10-CM

## 2025-02-28 PROBLEM — J45.901 EXACERBATION OF ASTHMA (HHS-HCC): Status: ACTIVE | Noted: 2025-02-01

## 2025-02-28 PROCEDURE — 99213 OFFICE O/P EST LOW 20 MIN: CPT | Performed by: PHYSICIAN ASSISTANT

## 2025-02-28 RX ORDER — APIXABAN 5 MG/1
5 TABLET, FILM COATED ORAL
COMMUNITY
Start: 2025-02-06

## 2025-02-28 NOTE — PROGRESS NOTES
"Chief Complaint:   Establish Care, atrial fibrillation recurrence in the setting of influenza A infection     History Of Present Illness:    Lennox Domingo is a 54 y.o. male presenting after recent hospitalization for influenza A infection and noted to have been in atrial fibrillation.  He was noted to be in atrial fibrillation upon arrival at [facility], apparently he was tentatively scheduled for DCCV when he spontaneously converted to sinus rhythm.  2D echo completed during hospitalization displays LVEF 60-65% without considerable valvulopathy.  Patient with previous history of paroxysmal atrial fibrillation s/p remote PVI via cryoablation in 2017, he has continued flecainide 100mg BID and Eliquis 5mg BID.       Last Recorded Vitals:  Vitals:    02/28/25 1324   BP: 138/72   BP Location: Left arm   Patient Position: Sitting   BP Cuff Size: Adult   Pulse: 70   SpO2: 94%   Weight: (!) 160 kg (352 lb)   Height: 1.88 m (6' 2\")       Past Medical History:  He has a past medical history of Asthma, Cardiology follow-up encounter (11/06/2023), Erectile dysfunction after radical prostatectomy, Hypertension, Lumbar radiculopathy, Paroxysmal atrial fibrillation (Multi), Paroxysmal atrial fibrillation with rapid ventricular response (Multi) (11/03/2023), Prostate cancer (Multi), and Sleep apnea.    Past Surgical History:  He has a past surgical history that includes Ablation of dysrhythmic focus (2014); Prostatectomy (2021); Urinary sphincter implant (07/19/2022); and Cystoscopy (02/10/2022).      Social History:  He reports that he has been smoking cigars. He has been exposed to tobacco smoke. He has never used smokeless tobacco. He reports current alcohol use of about 1.0 standard drink of alcohol per week. He reports that he does not use drugs.    Family History:  Family History   Problem Relation Name Age of Onset    Thyroid disease Mother      Hypertension Mother      Atrial fibrillation Father      No Known " Problems Sister      Asthma Brother          Allergies:  Shellfish derived and Iodine    Outpatient Medications:  Current Outpatient Medications   Medication Instructions    acetaminophen (TYLENOL) 650 mg, oral, Every 6 hours PRN    albuterol 2.5 mg /3 mL (0.083 %) nebulizer solution inhale contents of 1 vial ( 3 milliliters ) in nebulizer by mouth...  (REFER TO PRESCRIPTION NOTES).    albuterol 90 mcg/actuation inhaler 2 puffs, 3 times daily RT    amLODIPine (NORVASC) 5 mg, oral, Daily    bumetanide (BUMEX) 2 mg, oral, Daily    doxazosin (CARDURA) 4 mg, oral, Daily    Eliquis 5 mg    flecainide (TAMBOCOR) 100 mg, oral, 2 times daily    gabapentin (NEURONTIN) 300 mg, oral, 3 times daily    hydrALAZINE (APRESOLINE) 50 mg, 3 times daily    metoprolol tartrate (LOPRESSOR) 50 mg, oral, 2 times daily    montelukast (SINGULAIR) 10 mg, Daily    olmesartan (BENICAR) 40 mg, oral, Daily    potassium chloride CR (Klor-Con) 10 mEq ER tablet 10 mEq, oral, Daily, Do not crush, chew, or split.    Trelegy Ellipta 200-62.5-25 mcg blister with device 1 puff, inhalation, Every Day       Physical Exam:  Constitutional: awake and alert, oriented ×3, no apparent distress  Skin: warm, dry, good turgor no obvious lesions  Eyes: pupils equal, round, reactive to light, conjunctiva pink and noninjected, no discharge  HENT: normocephalic and atraumatic, mucous membranes moist, trachea midline with no masses/goiter  Cardiovascular: S1/S2 regular, no murmur no rubs/gallops, no carotid bruits, no JVD  Pulmonary: symmetrical chest expansion, lungs are clear to auscultation bilaterally, no wheezes/rales/rhonchi, normal effort  Abdomen: nontender, nondistended, active bowel sounds, no ascites  Extremities: no cyanosis, clubbing, no LE edema no lesions; palpable pedal pulses  Neurologic: cranial nerves II - XII grossly intact, stable gait, no tremor       Last Labs:  CBC -  Lab Results   Component Value Date    WBC 8.5 01/31/2024    HGB 13.4 (L)  01/31/2024    HCT 42.3 01/31/2024    MCV 89 01/31/2024     01/31/2024       CMP -  Lab Results   Component Value Date    CALCIUM 9.5 10/29/2024    PROT 7.0 10/29/2024    ALBUMIN 4.5 10/29/2024    AST 15 10/29/2024    ALT 16 10/29/2024    ALKPHOS 55 10/29/2024    BILITOT 0.5 10/29/2024       LIPID PANEL -   Lab Results   Component Value Date    CHOL 158 10/29/2024    TRIG 128 10/29/2024    HDL 43.7 10/29/2024    CHHDL 3.6 10/29/2024    LDLF 66 08/10/2023    VLDL 26 10/29/2024    NHDL 114 10/29/2024       RENAL FUNCTION PANEL -   Lab Results   Component Value Date    GLUCOSE 106 (H) 10/29/2024     (H) 10/29/2024    K 3.5 10/29/2024     10/29/2024    CO2 32 10/29/2024    ANIONGAP 14 10/29/2024    BUN 13 10/29/2024    CREATININE 1.20 10/29/2024    GFRMALE 74 08/10/2023    CALCIUM 9.5 10/29/2024    ALBUMIN 4.5 10/29/2024        Lab Results   Component Value Date    BNP 49 10/29/2024    HGBA1C 5.8 (H) 01/31/2024       Last Cardiology Tests:  ECG:  ECG 12 lead (Clinic Performed) 01/29/2025      Echo:  Transthoracic Echo (TTE) Complete 08/21/2024      Ejection Fractions:  EF   Date/Time Value Ref Range Status   08/21/2024 10:00 AM 57 %        Cath:  No results found for this or any previous visit from the past 1095 days.      Stress Test:  No results found for this or any previous visit from the past 1095 days.      Cardiac Imaging:  No results found for this or any previous visit from the past 1095 days.      Assessment/Plan   Problem List Items Addressed This Visit             ICD-10-CM       Cardiac and Vasculature    Paroxysmal atrial fibrillation with rapid ventricular response (Multi) - Primary (Chronic) I48.0    Hypertension (Chronic) I10       Infectious Diseases    Influenza A J10.1       -As discussed in HPI    -Atrial fibrillation with RVR in the setting of influenza A infection    -Minimal symptomatic recurrence of atrial fibrillation since hospitalization    -Continue flecainide 100mg BID,  metoprolol tartrate 50mg BID, and DOAC therapy    -RTC 6 months      ESTIVNE NavaC

## 2025-03-01 LAB
ANION GAP SERPL CALCULATED.4IONS-SCNC: 10 MMOL/L (CALC) (ref 7–17)
BUN SERPL-MCNC: 13 MG/DL (ref 7–25)
BUN/CREAT SERPL: NORMAL (CALC) (ref 6–22)
CALCIUM SERPL-MCNC: 8.9 MG/DL (ref 8.6–10.3)
CHLORIDE SERPL-SCNC: 106 MMOL/L (ref 98–110)
CO2 SERPL-SCNC: 27 MMOL/L (ref 20–32)
CREAT SERPL-MCNC: 1.06 MG/DL (ref 0.7–1.3)
EGFRCR SERPLBLD CKD-EPI 2021: 84 ML/MIN/1.73M2
GLUCOSE SERPL-MCNC: 91 MG/DL (ref 65–99)
POTASSIUM SERPL-SCNC: 4 MMOL/L (ref 3.5–5.3)
SODIUM SERPL-SCNC: 143 MMOL/L (ref 135–146)

## 2025-03-19 DIAGNOSIS — I48.0 PAROXYSMAL ATRIAL FIBRILLATION WITH RAPID VENTRICULAR RESPONSE (MULTI): Chronic | ICD-10-CM

## 2025-03-20 RX ORDER — APIXABAN 5 MG/1
5 TABLET, FILM COATED ORAL 2 TIMES DAILY
Qty: 180 TABLET | Refills: 2 | Status: SHIPPED | OUTPATIENT
Start: 2025-03-20

## 2025-04-03 DIAGNOSIS — I48.0 PAROXYSMAL ATRIAL FIBRILLATION WITH RAPID VENTRICULAR RESPONSE (MULTI): ICD-10-CM

## 2025-04-03 RX ORDER — FLECAINIDE ACETATE 100 MG/1
100 TABLET ORAL 2 TIMES DAILY
Qty: 180 TABLET | Refills: 3 | Status: SHIPPED | OUTPATIENT
Start: 2025-04-03

## 2025-04-09 ENCOUNTER — APPOINTMENT (OUTPATIENT)
Dept: UROLOGY | Facility: CLINIC | Age: 55
End: 2025-04-09
Payer: COMMERCIAL

## 2025-04-10 LAB — PSA SERPL-MCNC: <0.04 NG/ML

## 2025-04-30 ENCOUNTER — APPOINTMENT (OUTPATIENT)
Dept: UROLOGY | Facility: CLINIC | Age: 55
End: 2025-04-30
Payer: COMMERCIAL

## 2025-04-30 VITALS
HEART RATE: 69 BPM | WEIGHT: 315 LBS | BODY MASS INDEX: 40.43 KG/M2 | SYSTOLIC BLOOD PRESSURE: 156 MMHG | DIASTOLIC BLOOD PRESSURE: 91 MMHG | HEIGHT: 74 IN | TEMPERATURE: 97.8 F

## 2025-04-30 DIAGNOSIS — N32.0 BLADDER NECK CONTRACTURE: ICD-10-CM

## 2025-04-30 DIAGNOSIS — C61 PROSTATE CANCER (MULTI): ICD-10-CM

## 2025-04-30 DIAGNOSIS — Z96.0 S/P INSERTION OF PENILE IMPLANT: ICD-10-CM

## 2025-04-30 DIAGNOSIS — R97.20 ELEVATED PSA: Primary | ICD-10-CM

## 2025-04-30 DIAGNOSIS — R32 URINARY INCONTINENCE, UNSPECIFIED TYPE: ICD-10-CM

## 2025-04-30 LAB
POC APPEARANCE, URINE: CLEAR
POC BILIRUBIN, URINE: NEGATIVE
POC BLOOD, URINE: NEGATIVE
POC COLOR, URINE: YELLOW
POC GLUCOSE, URINE: NEGATIVE MG/DL
POC KETONES, URINE: NEGATIVE MG/DL
POC LEUKOCYTES, URINE: NEGATIVE
POC NITRITE,URINE: NEGATIVE
POC PH, URINE: 7 PH
POC PROTEIN, URINE: NEGATIVE MG/DL
POC SPECIFIC GRAVITY, URINE: 1.01
POC UROBILINOGEN, URINE: 0.2 EU/DL

## 2025-04-30 PROCEDURE — 3080F DIAST BP >= 90 MM HG: CPT | Performed by: STUDENT IN AN ORGANIZED HEALTH CARE EDUCATION/TRAINING PROGRAM

## 2025-04-30 PROCEDURE — G2211 COMPLEX E/M VISIT ADD ON: HCPCS | Performed by: STUDENT IN AN ORGANIZED HEALTH CARE EDUCATION/TRAINING PROGRAM

## 2025-04-30 PROCEDURE — 99213 OFFICE O/P EST LOW 20 MIN: CPT | Performed by: STUDENT IN AN ORGANIZED HEALTH CARE EDUCATION/TRAINING PROGRAM

## 2025-04-30 PROCEDURE — 3077F SYST BP >= 140 MM HG: CPT | Performed by: STUDENT IN AN ORGANIZED HEALTH CARE EDUCATION/TRAINING PROGRAM

## 2025-04-30 PROCEDURE — 3008F BODY MASS INDEX DOCD: CPT | Performed by: STUDENT IN AN ORGANIZED HEALTH CARE EDUCATION/TRAINING PROGRAM

## 2025-04-30 PROCEDURE — 81003 URINALYSIS AUTO W/O SCOPE: CPT | Performed by: STUDENT IN AN ORGANIZED HEALTH CARE EDUCATION/TRAINING PROGRAM

## 2025-04-30 ASSESSMENT — PATIENT HEALTH QUESTIONNAIRE - PHQ9
1. LITTLE INTEREST OR PLEASURE IN DOING THINGS: NOT AT ALL
2. FEELING DOWN, DEPRESSED OR HOPELESS: NOT AT ALL
SUM OF ALL RESPONSES TO PHQ9 QUESTIONS 1 AND 2: 0

## 2025-04-30 NOTE — PROGRESS NOTES
Scribed for Dr. Lencho Padilla by Del Pratt. I, Dr. Lencho Padilla have personally reviewed and agreed with the information entered by the Virtual Scribe. 04/30/25.    ASSESSMENT:  Problem List Items Addressed This Visit       Prostate cancer (Multi)    Relevant Orders    PSA    Bladder neck contracture    Relevant Orders    PSA    S/P insertion of penile implant    Relevant Orders    PSA     Other Visit Diagnoses         Elevated PSA    -  Primary    Relevant Orders    POCT UA Automated manually resulted (Completed)    PSA      Urinary incontinence, unspecified type        Relevant Orders    PSA              Prostate cancer s/p RALP - next PSA ordered - last 05/2023  Bladder neck contracture - now s/p BNI (Feb 2022), remains patent  PP-CHRISTOS - now s/p AUS (activated Sept 2022)  PP-ED - now s/p IPP    PLAN:  #Bladder neck contracture  #Urinary incontinence  Doing well s/p BNI and AUS implant.   Stream remains strong, empties well; no signs of BNC recurrence.   Incontinence remains 80% better compared to before AUS placement.   Still bothersome, but not enough to warrant surgical intervention.  Wears liners during the day (~1x daily);   Wears pads // adult briefs at night.    RTC in 1 year for reassessment and PVR.     Discussion:  Discussed options for his ongoing incontinence including continued observation vs  revision surgery with a tighter cuff. He is not interested in surgery at this time, elects to continue observation only for now. May consider down-sizing cuff in the future, can revisit options next visit.      #Prostate cancer  PSA remains undetectable. (<0.04, April 2025)  Continue annual PSA screening.   RTC in 1 year with a PSA prior to.     #Erectile dysfunction:  S/p IPP placement with Dr. Mancilla. (02/07/24)  Doing well post-operatively, continues to heal.   Continues follow up with Dr. Mancilla.     All questions were answered to the patient’s satisfaction.  Patient agrees with the plan and wishes to  proceed.  Continue follow-up for ongoing care of his chronic medical conditions.       History of Present Illness (HPI):  Lennox presents for a follow up visit.  The patient’s EMR has been reviewed.  Lives in [place], [place].  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    History of prostate cancer s/p RALP (06/2020) and adjuvant radiation.  Post-op c/b BNC, and underwent BNI in Feb 2022.   Following BNI, c/o persistent and severe CHRISTOS.  Initially referred by Dr. Mancilla for AUS consideration.  Now s/p AUS placement with me, activated in Sept 2022.   Leakage remains 80% improved since activation.  PSA remains undetectable and UDT. (April 2025)     For his severe erectile dysfunction.   He underwent IPP placement with Dr. Mancilla. (02/07/24)    TODAY: (04/30/25)  Presents for follow up and PSA results.   Remains satisfied with results of his AUS.  Stream remains strong, empties well; no signs of BNC recurrence.   Incontinence remains 80% better compared to before AUS placement.   Somewhat bothersome, but not enough to warrant surgical intervention.  States he primarily leaks at night, or with coughing only.  Wears liners during the day (~1x daily);   Wears pads // adult briefs at night.    PSA summary:  <0.04 ~ April 2025  <0.10 ~ March 2024  <0.10 ~ May 2022    TO REVIEW: (10/09/24)  Presents for follow up visit.   Reports he has been doing well overall.   Leakage persists, but considered stable.   Wears liners during the day (1-2x daily);   Wears pads // adult briefs at night.  No recent infections, gross hematuria, fevers or chills.     TO REVIEW: (03/27/24)  C/o persistent leakage.  Changing pads/adult briefs 1-2x daily.   Otherwise, urinary symptoms have been stable.   Stream remains strong, and feels he empties well.  Denies any recent infections, gross hematuria.   Reports he has been doing well overall.    Recently underwent IPP placement with Dr. Mancilla.   Continues to heal, no recent infections.   Had  some pain initially post-op.   Since improved and not bothersome.    TO REVIEW: (09/27/23)  Continues to do well overall.   C/o persistent leakage and ED.  The leakage remains 80% improved since AUS activation (Sept 2022)  However, still requires precautionary pads/diaper for leakage.  This is somewhat bothersome, but he remains satisfied with the results of the procedure.      C/o persistent severe ED.  Interested in being treated for this.   He is leaning towards IPP placement.      Otherwise, denies any acute or worsening concerns.   Denies any recent UTIs, gross hematuria, fevers or chills.      TO REVIEW: (12/07/22)  Reports improvement s/p AUS, however still experiencing some mild persistent leakage, particularly at night or at work despite AUS. Reports he is 80% improved and happy with results. Wears a diaper during the day at work but never has to change this. Reports strong stream. Acknowledges persistent erectile dysfunction, interested in learning more regarding therapeutic options.      TO REVIEW:  History of Marleni 3+4=7 prostate cancer.  s/p RALP 06/2020 with Dr. Martinez.  PSA <0.10 ng/mL 11/2021.    He reports that after his prostatectomy he experienced BNC  This required multiple dilations in office and OR, last was in June 2020  stream is ok, feels like he empties well (PVR in Nov visit is 10ml)  No regular urgency or frequency but does have CHRISTOS  Reports leakage is constant, can control this better during the day  Uses 2 pads during the day then 3-4 pull-ups at night  No hematuria, dysuria, UTIs.    Was seen by Dr. Mancilla and underwent cystoscopy.   Revealed ok coaptation of external sphincter but BNC present, unable to pass scope.  Underwent BNI in Feb 2022.   Post-op complained of persistent CHRISTOS.  Initially referred by Dr. Mancilla for AUS consideration.     Reports today he is voiding well without obstruction  Had ongoing PP-CHRISTOS, worse at night  Wearing pull-up, going through many of these a day when he  is actually active and drinking water  No dysuria or hematuria, feeling well    C/o severe ED. BLANCA 0  He has failed multiple PDE5i  Gets some engorgement but never firm  Erections were good prior to surgery  Not interested in treatment until incontinence improved.     PMH/PSH/Family/Social history all reviewed and unchanged  has atrial fibrillation, no on anticoagulation  Nonsmoker  No family history of  malignancy     Past Medical History:   Diagnosis Date    Asthma     Cardiology follow-up encounter 11/06/2023    James C Ramicone,     Erectile dysfunction after radical prostatectomy     Hypertension     Lumbar radiculopathy     Paroxysmal atrial fibrillation (Multi)     Paroxysmal atrial fibrillation with rapid ventricular response (Multi) 11/03/2023    Prostate cancer (Multi)     Sleep apnea     does not use his CPAP     Past Surgical History:   Procedure Laterality Date    ABLATION OF DYSRHYTHMIC FOCUS  2014    Catheter Ablation    CYSTOSCOPY  02/10/2022    PROSTATECTOMY  2021    URINARY SPHINCTER IMPLANT  07/19/2022     Family History   Problem Relation Name Age of Onset    Thyroid disease Mother      Hypertension Mother      Atrial fibrillation Father      No Known Problems Sister      Asthma Brother       Social History     Tobacco Use   Smoking Status Some Days    Types: Cigars    Passive exposure: Current   Smokeless Tobacco Never     Current Outpatient Medications   Medication Sig Dispense Refill    acetaminophen (Tylenol) 325 mg tablet Take 2 tablets (650 mg) by mouth every 6 hours if needed for mild pain (1 - 3). (Patient not taking: Reported on 2/18/2025) 30 tablet 0    albuterol 2.5 mg /3 mL (0.083 %) nebulizer solution inhale contents of 1 vial ( 3 milliliters ) in nebulizer by mouth...  (REFER TO PRESCRIPTION NOTES).      albuterol 90 mcg/actuation inhaler Inhale 2 puffs 3 times a day.      amLODIPine (Norvasc) 5 mg tablet Take 1 tablet (5 mg) by mouth once daily. 90 tablet 3    bumetanide  (Bumex) 2 mg tablet Take 1 tablet (2 mg) by mouth once daily. 30 tablet 11    doxazosin (Cardura) 4 mg tablet Take 1 tablet (4 mg) by mouth once daily. 90 tablet 3    Eliquis 5 mg tablet Take 1 tablet (5 mg) by mouth 2 times a day. 180 tablet 2    flecainide (Tambocor) 100 mg tablet TAKE 1 TABLET BY MOUTH TWICE A  tablet 3    gabapentin (Neurontin) 300 mg capsule Take 1 capsule (300 mg) by mouth 3 times a day for 7 days. 21 capsule 0    hydrALAZINE (Apresoline) 50 mg tablet Take 1 tablet (50 mg) by mouth 3 times a day.      metoprolol tartrate (Lopressor) 50 mg tablet TAKE 1 TABLET BY MOUTH TWICE A  tablet 3    montelukast (Singulair) 10 mg tablet Take 1 tablet (10 mg) by mouth once daily.      olmesartan (BENIcar) 40 mg tablet Take 1 tablet (40 mg) by mouth once daily. 90 tablet 3    potassium chloride CR (Klor-Con) 10 mEq ER tablet Take 1 tablet (10 mEq) by mouth once daily. Do not crush, chew, or split. 90 tablet 3    Trelegy Ellipta 200-62.5-25 mcg blister with device Inhale 1 puff once every day. 180 each 3     No current facility-administered medications for this visit.     Allergies   Allergen Reactions    Shellfish Derived Anaphylaxis    Iodine Unknown     Past medical, surgical, family and social history in the chart was reviewed and is accurate including any additions to what is in this HPI.    REVIEW OF SYSTEMS (ROS):   Constitutional: denies any unintentional weight loss or change in strength.  Integumentary: denies any rashes or pruritus.  Eyes: denies any double vision or eye pain.  Ear/Nose/Mouth/Throat: denies any nosebleeds or gum bleeds.  Cardiovascular: denies any chest pain or syncope.  Respiratory: denies hemoptysis.  Gastrointestinal: denies nausea or vomiting.  Musculoskeletal: denies muscle cramping or weakness.  Neurologic: denies convulsions or seizures.  Hematologic/Lymphatic: denies bleeding tendencies.  Endocrine: denies heat/cold intolerance.  All other systems have been  reviewed and are negative unless otherwise noted in the HPI.     OBJECTIVE:  There were no vitals taken for this visit.  PHYSICAL EXAM:  Constitutional: No obvious distress.  Eyes: Non-injected conjunctiva, sclera clear, EOMI.  Ears/Nose/Mouth/Throat: No obvious drainage per ears or nose.  Cardiovascular: Extremities are warm and well perfused. No edema, cyanosis or pallor.  Respiratory: No audible wheezing/stridor; respirations do not appear labored.  Gastrointestinal: Abdomen soft, not distended.  Musculoskeletal: Normal ROM of extremities.  Skin: No obvious rashes or open sores.  Neurologic: Alert and oriented, CN 2-12 grossly intact.  Psychiatric: Answers questions appropriately with normal affect.  Hematologic/Lymphatic/Immunologic: No obvious bruises or sites of spontaneous bleeding.  Genitourinary: No CVA tenderness, bladder not palpable.     LABS & IMAGING:  Lab Results   Component Value Date    WBC 8.5 01/31/2024    HGB 13.4 (L) 01/31/2024    HCT 42.3 01/31/2024     01/31/2024    CHOL 158 10/29/2024    TRIG 128 10/29/2024    HDL 43.7 10/29/2024    ALT 16 10/29/2024    AST 15 10/29/2024     02/28/2025    K 4.0 02/28/2025     02/28/2025    CREATININE 1.06 02/28/2025    BUN 13 02/28/2025    CO2 27 02/28/2025    TSH 0.48 10/29/2024    PSA <0.04 04/09/2025    INR 0.9 01/31/2024    HGBA1C 5.8 (H) 01/31/2024     Scribed for Dr. Lencho Padilla by Del Pratt.  I, Dr. Lencho Padilla have personally reviewed and agreed with the information entered by the Virtual Scribe. 04/30/25.

## 2025-05-01 ENCOUNTER — APPOINTMENT (OUTPATIENT)
Dept: PRIMARY CARE | Facility: CLINIC | Age: 55
End: 2025-05-01
Payer: COMMERCIAL

## 2025-05-01 VITALS
WEIGHT: 315 LBS | HEIGHT: 74 IN | SYSTOLIC BLOOD PRESSURE: 148 MMHG | HEART RATE: 67 BPM | DIASTOLIC BLOOD PRESSURE: 84 MMHG | OXYGEN SATURATION: 91 % | BODY MASS INDEX: 40.43 KG/M2

## 2025-05-01 DIAGNOSIS — I10 PRIMARY HYPERTENSION: Primary | Chronic | ICD-10-CM

## 2025-05-01 DIAGNOSIS — J45.40 MODERATE PERSISTENT ASTHMA WITHOUT COMPLICATION (HHS-HCC): ICD-10-CM

## 2025-05-01 DIAGNOSIS — J30.2 SEASONAL ALLERGIES: ICD-10-CM

## 2025-05-01 PROBLEM — J45.901 EXACERBATION OF ASTHMA (HHS-HCC): Status: RESOLVED | Noted: 2025-02-01 | Resolved: 2025-05-01

## 2025-05-01 PROBLEM — J10.1 INFLUENZA A: Status: RESOLVED | Noted: 2025-02-18 | Resolved: 2025-05-01

## 2025-05-01 PROCEDURE — 99214 OFFICE O/P EST MOD 30 MIN: CPT | Performed by: INTERNAL MEDICINE

## 2025-05-01 PROCEDURE — 3079F DIAST BP 80-89 MM HG: CPT | Performed by: INTERNAL MEDICINE

## 2025-05-01 PROCEDURE — 3008F BODY MASS INDEX DOCD: CPT | Performed by: INTERNAL MEDICINE

## 2025-05-01 PROCEDURE — 3077F SYST BP >= 140 MM HG: CPT | Performed by: INTERNAL MEDICINE

## 2025-05-01 RX ORDER — SPIRONOLACTONE 25 MG/1
25 TABLET ORAL DAILY
Qty: 30 TABLET | Refills: 3 | Status: SHIPPED | OUTPATIENT
Start: 2025-05-01 | End: 2025-08-29

## 2025-05-01 ASSESSMENT — PATIENT HEALTH QUESTIONNAIRE - PHQ9
SUM OF ALL RESPONSES TO PHQ9 QUESTIONS 1 & 2: 0
1. LITTLE INTEREST OR PLEASURE IN DOING THINGS: NOT AT ALL
2. FEELING DOWN, DEPRESSED OR HOPELESS: NOT AT ALL

## 2025-05-01 NOTE — PROGRESS NOTES
"Subjective   Patient ID: Lennox Domingo is a 54 y.o. male who presents for Follow-up.    HPI   Allergies bothering him  Reports breathing is controlled  Compliant with trelegy, uses albuterol about BID regularly  Seeing pulm regularly    Got new BP cuff but hasn't really used it yet.  BP high yesterday at Urology  No HA, dizziness, CP      Review of Systems    Objective   /84 (BP Location: Right arm, Patient Position: Sitting)   Pulse 67   Ht 1.88 m (6' 2\")   Wt (!) 161 kg (355 lb)   SpO2 91%   BMI 45.58 kg/m²     Physical Exam  Constitutional:       Appearance: Normal appearance.   Cardiovascular:      Rate and Rhythm: Normal rate and regular rhythm.      Heart sounds: No murmur heard.  Pulmonary:      Effort: Pulmonary effort is normal.      Breath sounds: Normal breath sounds.   Musculoskeletal:      Right lower leg: Edema (trace) present.      Left lower leg: Edema (trace) present.   Neurological:      General: No focal deficit present.      Mental Status: He is alert.      Gait: Gait normal.         Assessment/Plan   Problem List Items Addressed This Visit           ICD-10-CM    Seasonal allergies J30.2    Hypertension - Primary (Chronic) I10    Relevant Medications    spironolactone (Aldactone) 25 mg tablet    Other Relevant Orders    Basic Metabolic Panel    Moderate persistent asthma without complication (HHS-HCC) J45.40    Relevant Orders    Referral to Pulmonology        HTN   -Add spironololactone, stop potassium, BMP in 1-2 weeks     Afib - s/p ablation  -Continue current management  -Follows with Dr Ramicone     Asthma - Continue trelegy. F/u with Pulmonary but has hard time getting in with his, so referred to new Pulmonologist today.     Lumbar radiculopathy  -Following with chiropractor and workers comp     Prostate cancer, urinary incontinence  -Seeing Urology     REJI  -Cannot tolerate CPAP. Previously referred Sleep Surgery.     Leg swelling - controlled now  -On bumex from " Cardiology  -10mg amlodipine made swelling worse     Health Maintenance  -Colonoscopy 11/28/12 - overdue, order is in and pt is attempting to schedule  -Immunizations   -Tdap 2016   -Pneumovax 2016     f/u 3 months

## 2025-05-10 DIAGNOSIS — R60.0 EDEMA OF LOWER EXTREMITY: ICD-10-CM

## 2025-05-10 DIAGNOSIS — I10 PRIMARY HYPERTENSION: ICD-10-CM

## 2025-05-12 RX ORDER — OLMESARTAN MEDOXOMIL 40 MG/1
40 TABLET ORAL DAILY
Qty: 90 TABLET | Refills: 3 | Status: SHIPPED | OUTPATIENT
Start: 2025-05-12

## 2025-05-12 RX ORDER — BUMETANIDE 2 MG/1
2 TABLET ORAL DAILY
Qty: 90 TABLET | Refills: 3 | Status: SHIPPED | OUTPATIENT
Start: 2025-05-12

## 2025-05-27 ENCOUNTER — PATIENT MESSAGE (OUTPATIENT)
Dept: PRIMARY CARE | Facility: CLINIC | Age: 55
End: 2025-05-27
Payer: COMMERCIAL

## 2025-06-11 LAB
ANION GAP SERPL CALCULATED.4IONS-SCNC: 9 MMOL/L (CALC) (ref 7–17)
BUN SERPL-MCNC: 14 MG/DL (ref 7–25)
BUN/CREAT SERPL: NORMAL (CALC) (ref 6–22)
CALCIUM SERPL-MCNC: 9.6 MG/DL (ref 8.6–10.3)
CHLORIDE SERPL-SCNC: 101 MMOL/L (ref 98–110)
CO2 SERPL-SCNC: 28 MMOL/L (ref 20–32)
CREAT SERPL-MCNC: 1.26 MG/DL (ref 0.7–1.3)
EGFRCR SERPLBLD CKD-EPI 2021: 68 ML/MIN/1.73M2
GLUCOSE SERPL-MCNC: 85 MG/DL (ref 65–99)
POTASSIUM SERPL-SCNC: 4.2 MMOL/L (ref 3.5–5.3)
SODIUM SERPL-SCNC: 138 MMOL/L (ref 135–146)

## 2025-07-18 ENCOUNTER — APPOINTMENT (OUTPATIENT)
Dept: PULMONOLOGY | Facility: CLINIC | Age: 55
End: 2025-07-18
Payer: COMMERCIAL

## 2025-08-01 ENCOUNTER — APPOINTMENT (OUTPATIENT)
Dept: DERMATOLOGY | Facility: CLINIC | Age: 55
End: 2025-08-01
Payer: COMMERCIAL

## 2025-08-01 DIAGNOSIS — D23.9 DERMATOFIBROMA: Primary | ICD-10-CM

## 2025-08-01 DIAGNOSIS — D22.60 MELANOCYTIC NEVI OF UNSPECIFIED UPPER LIMB, INCLUDING SHOULDER: ICD-10-CM

## 2025-08-01 PROCEDURE — 99202 OFFICE O/P NEW SF 15 MIN: CPT | Performed by: DERMATOLOGY

## 2025-08-01 ASSESSMENT — DERMATOLOGY QUALITY OF LIFE (QOL) ASSESSMENT
ARE THERE EXCLUSIONS OR EXCEPTIONS FOR THE QUALITY OF LIFE ASSESSMENT: NO
RATE HOW BOTHERED YOU ARE BY EFFECTS OF YOUR SKIN PROBLEMS ON YOUR ACTIVITIES (EG, GOING OUT, ACCOMPLISHING WHAT YOU WANT, WORK ACTIVITIES OR YOUR RELATIONSHIPS WITH OTHERS): 1
RATE HOW EMOTIONALLY BOTHERED YOU ARE BY YOUR SKIN PROBLEM (FOR EXAMPLE, WORRY, EMBARRASSMENT, FRUSTRATION): 2
RATE HOW BOTHERED YOU ARE BY SYMPTOMS OF YOUR SKIN PROBLEM (EG, ITCHING, STINGING BURNING, HURTING OR SKIN IRRITATION): 2

## 2025-08-01 ASSESSMENT — DERMATOLOGY PATIENT ASSESSMENT
DO YOU USE SUNSCREEN: OCCASIONALLY
ARE YOU AN ORGAN TRANSPLANT RECIPIENT: NO
DO YOU HAVE ANY NEW OR CHANGING LESIONS: YES
WHERE ARE THESE NEW OR CHANGING LESIONS LOCATED: ARMS AND LEGS
DO YOU USE A TANNING BED: NO

## 2025-08-01 ASSESSMENT — PATIENT GLOBAL ASSESSMENT (PGA): PATIENT GLOBAL ASSESSMENT: PATIENT GLOBAL ASSESSMENT:  2 - MILD

## 2025-08-01 ASSESSMENT — ITCH NUMERIC RATING SCALE: HOW SEVERE IS YOUR ITCHING?: 2

## 2025-08-01 NOTE — Clinical Note
Clinically benign appearing nevi, no treatment is necessary.  ABCDEs of melanoma reviewed.  Please follow up should you notice any new or changing pre-existing skin lesion.

## 2025-08-01 NOTE — PROGRESS NOTES
Subjective     Lennox Domingo is a 54 y.o. male who presents for the following: Rash/Lesions (Pt has small bumps/spots on arms and lower legs. Present for a long time, more appearing. He has scratched them, but they recur. No hx of fhx of skin cancer. ).     Intake Questions  Do you have any new or changing Lesions?: Yes  Where are these new or changing lesion(s) located?: arms and legs  For patients coming in for a Follow-up Visit:  Have there been any changes in your health since your last visit?: NA  Are you an organ transplant recipient?: No  Do you use sunscreen?: Occasionally  Do you use a tanning bed?: No    Review of Systems:  No other skin or systemic complaints other than what is documented elsewhere in the note.    The following portions of the chart were reviewed this encounter and updated as appropriate:          Skin Cancer History  Biopsy Log Book  No skin cancers from Specimen Tracking.    Additional History      Specialty Problems          Dermatology Problems    Skin lesion        Objective   Well appearing patient in no apparent distress; mood and affect are within normal limits.    A focused skin examination was performed of bilateral forearms, left upper arm, left leg. All findings within normal limits unless otherwise noted below.    Assessment/Plan   Skin Exam  1. DERMATOFIBROMA (3)  Left Forearm - Anterior, Left Lower Leg - Anterior, Left Upper Arm - Anterior  Firm hyperpigmented papule with hyperpigmented halo, +dimple sign  Benign, scar tissue like growth that most frequently is due to bug bite or ingrown hair. No treatment is necessary.  2. MELANOCYTIC NEVI OF UNSPECIFIED UPPER LIMB, INCLUDING SHOULDER  Right Forearm - Anterior  Brown symmetric 2-3mm papule  Clinically benign appearing nevi, no treatment is necessary.  ABCDEs of melanoma reviewed.  Please follow up should you notice any new or changing pre-existing skin lesion.    Karma Perez DO   Dermatology Resident, PGY-4    I saw and  evaluated the patient. I personally obtained the key and critical portions of the history and physical exam or was physically present for key and critical portions performed by the resident/fellow. I reviewed the resident/fellow's documentation and discussed the patient with the resident/fellow. I agree with the resident/fellow's medical decision making as documented in the note.    Marylou Canchola, DO

## 2025-08-12 ENCOUNTER — APPOINTMENT (OUTPATIENT)
Dept: PRIMARY CARE | Facility: CLINIC | Age: 55
End: 2025-08-12
Payer: COMMERCIAL

## 2025-08-12 VITALS
HEIGHT: 74 IN | DIASTOLIC BLOOD PRESSURE: 85 MMHG | BODY MASS INDEX: 40.43 KG/M2 | WEIGHT: 315 LBS | HEART RATE: 63 BPM | SYSTOLIC BLOOD PRESSURE: 135 MMHG | OXYGEN SATURATION: 91 %

## 2025-08-12 DIAGNOSIS — Z13.6 SCREENING FOR CARDIOVASCULAR CONDITION: ICD-10-CM

## 2025-08-12 DIAGNOSIS — I10 PRIMARY HYPERTENSION: Primary | Chronic | ICD-10-CM

## 2025-08-12 DIAGNOSIS — M79.672 PAIN IN BOTH FEET: ICD-10-CM

## 2025-08-12 DIAGNOSIS — M79.671 PAIN IN BOTH FEET: ICD-10-CM

## 2025-08-12 DIAGNOSIS — J45.40 MODERATE PERSISTENT ASTHMA WITHOUT COMPLICATION (HHS-HCC): ICD-10-CM

## 2025-08-12 DIAGNOSIS — Z12.11 SCREENING FOR COLON CANCER: ICD-10-CM

## 2025-08-12 PROBLEM — M76.62 ACHILLES TENDONITIS, BILATERAL: Status: ACTIVE | Noted: 2025-07-14

## 2025-08-12 PROBLEM — M21.6X1 ACQUIRED EQUINUS DEFORMITY OF BOTH FEET: Status: ACTIVE | Noted: 2025-07-14

## 2025-08-12 PROBLEM — M76.61 ACHILLES TENDONITIS, BILATERAL: Status: ACTIVE | Noted: 2025-07-14

## 2025-08-12 PROBLEM — M92.61: Status: ACTIVE | Noted: 2025-07-14

## 2025-08-12 PROBLEM — M21.6X2 ACQUIRED EQUINUS DEFORMITY OF BOTH FEET: Status: ACTIVE | Noted: 2025-07-14

## 2025-08-12 PROBLEM — M92.62: Status: ACTIVE | Noted: 2025-07-14

## 2025-08-12 PROCEDURE — 3008F BODY MASS INDEX DOCD: CPT | Performed by: INTERNAL MEDICINE

## 2025-08-12 PROCEDURE — 3079F DIAST BP 80-89 MM HG: CPT | Performed by: INTERNAL MEDICINE

## 2025-08-12 PROCEDURE — 3075F SYST BP GE 130 - 139MM HG: CPT | Performed by: INTERNAL MEDICINE

## 2025-08-12 PROCEDURE — 99214 OFFICE O/P EST MOD 30 MIN: CPT | Performed by: INTERNAL MEDICINE

## 2025-08-12 SDOH — ECONOMIC STABILITY: FOOD INSECURITY: WITHIN THE PAST 12 MONTHS, YOU WORRIED THAT YOUR FOOD WOULD RUN OUT BEFORE YOU GOT MONEY TO BUY MORE.: NEVER TRUE

## 2025-08-12 SDOH — ECONOMIC STABILITY: FOOD INSECURITY: WITHIN THE PAST 12 MONTHS, THE FOOD YOU BOUGHT JUST DIDN'T LAST AND YOU DIDN'T HAVE MONEY TO GET MORE.: NEVER TRUE

## 2025-08-12 ASSESSMENT — COLUMBIA-SUICIDE SEVERITY RATING SCALE - C-SSRS
2. HAVE YOU ACTUALLY HAD ANY THOUGHTS OF KILLING YOURSELF?: NO
6. HAVE YOU EVER DONE ANYTHING, STARTED TO DO ANYTHING, OR PREPARED TO DO ANYTHING TO END YOUR LIFE?: NO
1. IN THE PAST MONTH, HAVE YOU WISHED YOU WERE DEAD OR WISHED YOU COULD GO TO SLEEP AND NOT WAKE UP?: NO

## 2025-08-12 ASSESSMENT — PAIN SCALES - GENERAL: PAINLEVEL_OUTOF10: 8

## 2025-08-21 ENCOUNTER — TELEPHONE (OUTPATIENT)
Dept: GASTROENTEROLOGY | Facility: CLINIC | Age: 55
End: 2025-08-21
Payer: COMMERCIAL

## 2025-08-21 DIAGNOSIS — Z12.11 COLON CANCER SCREENING: Primary | ICD-10-CM

## 2025-08-27 LAB
ALBUMIN SERPL-MCNC: 4.2 G/DL (ref 3.6–5.1)
ALP SERPL-CCNC: 43 U/L (ref 35–144)
ALT SERPL-CCNC: 15 U/L (ref 9–46)
ANION GAP SERPL CALCULATED.4IONS-SCNC: 7 MMOL/L (CALC) (ref 7–17)
AST SERPL-CCNC: 12 U/L (ref 10–35)
BILIRUB SERPL-MCNC: 0.6 MG/DL (ref 0.2–1.2)
BUN SERPL-MCNC: 17 MG/DL (ref 7–25)
CALCIUM SERPL-MCNC: 9.2 MG/DL (ref 8.6–10.3)
CHLORIDE SERPL-SCNC: 103 MMOL/L (ref 98–110)
CHOLEST SERPL-MCNC: 136 MG/DL
CHOLEST/HDLC SERPL: 3.3 (CALC)
CO2 SERPL-SCNC: 28 MMOL/L (ref 20–32)
CREAT SERPL-MCNC: 1.26 MG/DL (ref 0.7–1.3)
EGFRCR SERPLBLD CKD-EPI 2021: 68 ML/MIN/1.73M2
ERYTHROCYTE [DISTWIDTH] IN BLOOD BY AUTOMATED COUNT: 14.4 % (ref 11–15)
GLUCOSE SERPL-MCNC: 93 MG/DL (ref 65–99)
HCT VFR BLD AUTO: 45.4 % (ref 38.5–50)
HDLC SERPL-MCNC: 41 MG/DL
HGB BLD-MCNC: 14.3 G/DL (ref 13.2–17.1)
LDLC SERPL CALC-MCNC: 74 MG/DL (CALC)
MCH RBC QN AUTO: 27.5 PG (ref 27–33)
MCHC RBC AUTO-ENTMCNC: 31.5 G/DL (ref 32–36)
MCV RBC AUTO: 87.3 FL (ref 80–100)
NONHDLC SERPL-MCNC: 95 MG/DL (CALC)
PLATELET # BLD AUTO: 300 THOUSAND/UL (ref 140–400)
PMV BLD REES-ECKER: 9.6 FL (ref 7.5–12.5)
POTASSIUM SERPL-SCNC: 4.5 MMOL/L (ref 3.5–5.3)
PROT SERPL-MCNC: 6.5 G/DL (ref 6.1–8.1)
RBC # BLD AUTO: 5.2 MILLION/UL (ref 4.2–5.8)
SODIUM SERPL-SCNC: 138 MMOL/L (ref 135–146)
TRIGL SERPL-MCNC: 125 MG/DL
WBC # BLD AUTO: 6.8 THOUSAND/UL (ref 3.8–10.8)

## 2025-08-27 RX ORDER — SODIUM, POTASSIUM,MAG SULFATES 17.5-3.13G
SOLUTION, RECONSTITUTED, ORAL ORAL
Qty: 354 ML | Refills: 0 | Status: SHIPPED | OUTPATIENT
Start: 2025-08-27

## 2025-09-03 ENCOUNTER — APPOINTMENT (OUTPATIENT)
Dept: CARDIOLOGY | Facility: CLINIC | Age: 55
End: 2025-09-03
Payer: COMMERCIAL

## 2025-09-04 DIAGNOSIS — I10 PRIMARY HYPERTENSION: Chronic | ICD-10-CM

## 2025-09-04 RX ORDER — SPIRONOLACTONE 25 MG/1
25 TABLET ORAL DAILY
Qty: 90 TABLET | Refills: 1 | Status: SHIPPED | OUTPATIENT
Start: 2025-09-04

## 2025-10-10 ENCOUNTER — APPOINTMENT (OUTPATIENT)
Dept: PULMONOLOGY | Facility: CLINIC | Age: 55
End: 2025-10-10
Payer: COMMERCIAL

## 2025-12-03 ENCOUNTER — APPOINTMENT (OUTPATIENT)
Dept: CARDIOLOGY | Facility: CLINIC | Age: 55
End: 2025-12-03
Payer: COMMERCIAL

## 2026-02-03 ENCOUNTER — APPOINTMENT (OUTPATIENT)
Dept: PRIMARY CARE | Facility: CLINIC | Age: 56
End: 2026-02-03
Payer: COMMERCIAL

## 2026-02-12 ENCOUNTER — APPOINTMENT (OUTPATIENT)
Dept: PRIMARY CARE | Facility: CLINIC | Age: 56
End: 2026-02-12
Payer: COMMERCIAL

## 2026-05-06 ENCOUNTER — APPOINTMENT (OUTPATIENT)
Dept: UROLOGY | Facility: CLINIC | Age: 56
End: 2026-05-06
Payer: COMMERCIAL

## 2026-08-05 ENCOUNTER — APPOINTMENT (OUTPATIENT)
Dept: CARDIOLOGY | Facility: CLINIC | Age: 56
End: 2026-08-05
Payer: COMMERCIAL

## (undated) DEVICE — SLEEVE, SCD EXPRESS, KNEE LENGTH-MEDIUM

## (undated) DEVICE — ADHESIVE, SKIN, LIQUIBAND EXCEED

## (undated) DEVICE — COVER, BACK TABLE

## (undated) DEVICE — BAG, DECANTER

## (undated) DEVICE — SYRINGE, 20 CC, CONTROL, LUER LOCK, LF

## (undated) DEVICE — CATHETER TRAY, SURESTEP, 16FR, URINE METER W/STATLOCK

## (undated) DEVICE — SUTURE, VICRYL, 3-0, 27 IN, SH

## (undated) DEVICE — SYRINGE, 10 CC, LUER LOCK

## (undated) DEVICE — NEEDLE, HYPODERMIC, MONOJECT, 25 G X 1.5 IN, LUER LOCK HUB, RED

## (undated) DEVICE — DRESSING, NON-ADHERENT, TELFA, 3 X 8 IN, NS

## (undated) DEVICE — NEEDLE, BLOOD COLLECTION, W/12 IN TUBING, SAF-T WING, 21 G X 0.75 IN

## (undated) DEVICE — BANDAGE, GAUZE, 6 PLY, KERLIX, 4.5 IN X 4.1 YD, AMD, STERILE

## (undated) DEVICE — SUPPORTER, ATHLETIC, ADULT, LARGE

## (undated) DEVICE — SOLUTION, INJECTION, USP, SODIUM CHLORIDE 0.9%, .9, NACL, 1000 ML, BAG

## (undated) DEVICE — APPLICATOR, CHLORAPREP, W/ORANGE TINT, 26ML

## (undated) DEVICE — DRESSING, GAUZE, FLUFF, 1 PLY, 18 X 36 IN

## (undated) DEVICE — SUTURE, MONOCRYL 2-0 UR-6 27 IN, VIOLET

## (undated) DEVICE — Device

## (undated) DEVICE — SUTURE, MONOCRYL, 4-0, 27 IN, PS-2, UNDYED

## (undated) DEVICE — SYRINGE, 50 CC, LUER LOCK

## (undated) DEVICE — DRAPE, SHEET, ENDOSCOPY, GENERAL, FENESTRATED, ARMBOARD COVER, 98 X 123.5 IN, DISPOSABLE, LF, STERILE

## (undated) DEVICE — NEEDLE, HYPODERMIC, REGULAR WALL, REGULAR BEVEL, 22 G X 1.5 IN

## (undated) DEVICE — WOUND SYSTEM, DEBRIDEMENT & CLEANING, O.R DUOPAK

## (undated) DEVICE — GLOVE, SURGICAL, PROTEXIS PI , 7.5, PF, LF

## (undated) DEVICE — SUTURE, VICRYL, 2-0, 27 IN, SH, UNDYED

## (undated) DEVICE — TRAY, SKIN SCRUB, WET PREP, WITH 4 COMPARMENT

## (undated) DEVICE — DRAIN, WOUND, ROUND, W/TROCAR, HOLE PATTERN, 10 IN, MEDIUM, 1/8 X 49 IN

## (undated) DEVICE — EVACUATOR, WOUND, SUCTION, CLOSED, JACKSON-PRATT, 100 CC, SILICONE

## (undated) DEVICE — KIT, MINOR, DOUBLE BASIN

## (undated) DEVICE — SUTURE, ETHILON, 3-0, 18 IN, PS2, BLACK